# Patient Record
Sex: FEMALE | Race: WHITE | NOT HISPANIC OR LATINO | Employment: FULL TIME | URBAN - METROPOLITAN AREA
[De-identification: names, ages, dates, MRNs, and addresses within clinical notes are randomized per-mention and may not be internally consistent; named-entity substitution may affect disease eponyms.]

---

## 2019-02-27 ENCOUNTER — APPOINTMENT (OUTPATIENT)
Dept: WOMENS IMAGING | Facility: HOSPITAL | Age: 39
End: 2019-02-27

## 2019-02-27 PROCEDURE — 77063 BREAST TOMOSYNTHESIS BI: CPT | Performed by: RADIOLOGY

## 2019-02-27 PROCEDURE — 77067 SCR MAMMO BI INCL CAD: CPT | Performed by: RADIOLOGY

## 2020-12-03 ENCOUNTER — OFFICE VISIT (OUTPATIENT)
Dept: SURGERY | Facility: CLINIC | Age: 40
End: 2020-12-03

## 2020-12-03 VITALS — BODY MASS INDEX: 26.2 KG/M2 | WEIGHT: 183 LBS | HEIGHT: 70 IN

## 2020-12-03 DIAGNOSIS — L02.211 CUTANEOUS ABSCESS OF ABDOMINAL WALL: ICD-10-CM

## 2020-12-03 DIAGNOSIS — L72.3 SEBACEOUS CYST: Primary | ICD-10-CM

## 2020-12-03 PROCEDURE — 87205 SMEAR GRAM STAIN: CPT | Performed by: SURGERY

## 2020-12-03 PROCEDURE — 87070 CULTURE OTHR SPECIMN AEROBIC: CPT | Performed by: SURGERY

## 2020-12-03 PROCEDURE — 10060 I&D ABSCESS SIMPLE/SINGLE: CPT | Performed by: SURGERY

## 2020-12-03 PROCEDURE — 87076 CULTURE ANAEROBE IDENT EACH: CPT | Performed by: SURGERY

## 2020-12-03 RX ORDER — VALACYCLOVIR HYDROCHLORIDE 1 G/1
1000 TABLET, FILM COATED ORAL DAILY
COMMUNITY
Start: 2020-12-02 | End: 2020-12-03

## 2020-12-03 RX ORDER — SULFAMETHOXAZOLE AND TRIMETHOPRIM 800; 160 MG/1; MG/1
1 TABLET ORAL 2 TIMES DAILY
Qty: 6 TABLET | Refills: 0 | Status: SHIPPED | OUTPATIENT
Start: 2020-12-03 | End: 2020-12-08

## 2020-12-03 NOTE — PROGRESS NOTES
PREOPERATIVE DIAGNOSIS:  Infected sebaceous cyst left upper quadrant abdominal wall    POSTOPERATIVE DIAGNOSIS (FINDINGS):  Same    PROCEDURE:  Incision and drainage of infected sebaceous cyst left upper quadrant abdominal wall    SURGEON:  Jj Boucher MD    ANESTHESIA:  1% lidocaine with epinephrine local    EBL:  Minimal    SPECIMEN(S):  Culture    DESCRIPTION:  In supine position prepped and draped usual sterile manner.  1% lidocaine with epinephrine infiltrated locally.  Small transverse incision made directly over the cyst including the punctum of the cyst and several milliliters of sebum and purulent fluid were encountered and drained and cultured.  Once the wound was adequately drained it was packed with dry gauze.  Dressing applied.  Wound care instructions given.  Prescription for Bactrim sent into pharmacy.  Follow-up in 3 weeks.    Jj Boucher M.D.

## 2020-12-05 LAB
BACTERIA SPEC AEROBE CULT: ABNORMAL
GRAM STN SPEC: ABNORMAL
GRAM STN SPEC: ABNORMAL

## 2020-12-14 ENCOUNTER — TELEPHONE (OUTPATIENT)
Dept: SURGERY | Facility: CLINIC | Age: 40
End: 2020-12-14

## 2020-12-14 NOTE — TELEPHONE ENCOUNTER
Patient called today requesting a sedative for her upcoming in office cyst exc on 12/21/20 with Dr. Boucher. Reports significant nervousness about the procedure. Informed patient that it is not typical that the MDs prescribe anything for anxiety prior to a minor procedure, but I will ask regardless.

## 2020-12-15 NOTE — TELEPHONE ENCOUNTER
Can call in valium 5 mg po to take, #2. Problem is someone MUST bring her to office (she cannot drive herself if she takes valium).

## 2020-12-15 NOTE — TELEPHONE ENCOUNTER
Called in Valium 5 mg tab, take 1 tab po 1 hr prior to procedure, #2, NR, to [facility] on [address]   LVM for patient informing her of this and to be sure she has someone with her at the time of her procedure for transport.

## 2020-12-21 ENCOUNTER — OFFICE VISIT (OUTPATIENT)
Dept: SURGERY | Facility: CLINIC | Age: 40
End: 2020-12-21

## 2020-12-21 DIAGNOSIS — L72.3 SEBACEOUS CYST: Primary | ICD-10-CM

## 2020-12-21 PROCEDURE — 99212 OFFICE O/P EST SF 10 MIN: CPT | Performed by: SURGERY

## 2020-12-21 RX ORDER — DIAZEPAM 5 MG/1
5 TABLET ORAL
COMMUNITY
End: 2020-12-21

## 2020-12-22 NOTE — PROGRESS NOTES
IMPRESSION & PLAN:  40-year-old lady underwent incision and drainage of infected sebaceous cyst left upper quadrant abdominal wall in office on 12/3/2020.  She has completed her course of antibiotics.  There is no visible or palpable evidence of recurrent cyst at this time, just typical induration from healing.  I have advised her to keep an eye on the area and return if cyst recurs.    CC: Follow-up from office procedure    HPI: 40-year-old lady who underwent incision and drainage of infected sebaceous cyst of the left upper quadrant abdominal wall on 12/3/2020.  Reports no pain, drainage or other symptoms at this time.    PE:    Awake, alert  Abdomen: In the left upper quadrant is a well-healed small incision from recent drainage with no erythema, no fluctuance, no drainage, and no tenderness.  Mild induration as would be expected.

## 2021-04-16 ENCOUNTER — BULK ORDERING (OUTPATIENT)
Dept: CASE MANAGEMENT | Facility: OTHER | Age: 41
End: 2021-04-16

## 2021-04-16 DIAGNOSIS — Z23 IMMUNIZATION DUE: ICD-10-CM

## 2021-06-11 ENCOUNTER — APPOINTMENT (OUTPATIENT)
Dept: WOMENS IMAGING | Facility: HOSPITAL | Age: 41
End: 2021-06-11

## 2021-06-11 PROCEDURE — 77067 SCR MAMMO BI INCL CAD: CPT | Performed by: RADIOLOGY

## 2021-06-28 ENCOUNTER — TELEPHONE (OUTPATIENT)
Dept: SURGERY | Facility: CLINIC | Age: 41
End: 2021-06-28

## 2021-06-28 NOTE — TELEPHONE ENCOUNTER
Patient said she can any day except 7/1, & 7/2 and would like to know is there any other day this or next week?

## 2021-06-28 NOTE — TELEPHONE ENCOUNTER
Patient called and said that she had been to the ED this weekend related to severe abdominal pain. Looks like she went to [facility] her CT results are Negative and located in Care Everywhere. She said that the ED told her she may want to see a Internist, however they will not be able to see her for at least a month out. She has an appt. With you to discuss abdominal cyst and C-scope  On 7/22/21. She is wanting to see if there were anyway that you could see her this week.

## 2021-08-02 ENCOUNTER — OFFICE VISIT (OUTPATIENT)
Dept: SURGERY | Facility: CLINIC | Age: 41
End: 2021-08-02

## 2021-08-02 VITALS — WEIGHT: 173.4 LBS | BODY MASS INDEX: 24.82 KG/M2 | HEIGHT: 70 IN

## 2021-08-02 DIAGNOSIS — R59.9 LYMPH NODES ENLARGED: ICD-10-CM

## 2021-08-02 DIAGNOSIS — L90.5 SCAR OF ABDOMEN: Primary | ICD-10-CM

## 2021-08-02 DIAGNOSIS — Z80.0 FAMILY HX OF COLON CANCER: ICD-10-CM

## 2021-08-02 PROCEDURE — 99213 OFFICE O/P EST LOW 20 MIN: CPT | Performed by: SURGERY

## 2021-08-03 NOTE — PROGRESS NOTES
IMPRESSION & PLAN:  1.  Small minimally hypertrophied scar at site of previous sebaceous cyst left upper abdominal wall.  No palpable or visible evidence of recurrent cyst.  Recommended using Mederma or silicone patch if the hypertrophic scar is bothersome at all.  2.  Recent development of left cervical adenopathy.  This is adjacent to an insect bite on her left upper back/shoulder and almost certainly represents reactive adenopathy.  Instructed to keep an eye on this area and contact me if the nodes have not resolved within 4 weeks.  3.  Emergency room visit in late June secondary to roughly 2-week episode of epigastric abdominal pain.  The pain was not related to meals.  There is no associated nausea or vomiting.  There is no family history of gallbladder disease.  The symptoms have completely resolved.  CT scan at [facility] emergency room was negative.  No further work-up or treatment is warranted unless symptoms recur.  4.  Family history of colon cancer in father.  She has scheduled for colonoscopy on 9/1/2021.    CC: Principal complaint is discomfort in left upper quadrant abdominal wall    HPI: 40-year-old lady who underwent incision and drainage of infected sebaceous cyst left upper quadrant abdominal wall on 12/3/2020 and has some discomfort at that scar and is concerned about recurrence.  She also had an episode of epigastric pain that was intermittent and lasted 8 to 9 days towards the end of June and ultimately led to an emergency room visit at [facility] where CT scan of the abdomen was unremarkable.  The symptoms have since completely resolved.  Lastly, in the last 5 days she has noticed palpable nodules in the left cervical region adjacent to an insect bite along her left upper back/shoulder.    PE:    Awake, alert  Abdomen: Soft and nontender.  There is a 1 cm very minimally hypertrophied scar from her former incision and drainage site.  There is no visible or palpable evidence of recurrent  cyst.  Lymph nodes: 2 palpable posterior left cervical lymph nodes that are both under 1 cm and soft and freely movable.  These are adjacent to an insect bite on the upper back/left shoulder.  There is no erythema, crepitance or fluctuance over the insect bite.    LABS:      RADIOLOGY:

## 2021-09-01 ENCOUNTER — ANESTHESIA EVENT (OUTPATIENT)
Dept: GASTROENTEROLOGY | Facility: HOSPITAL | Age: 41
End: 2021-09-01

## 2021-09-01 ENCOUNTER — ANESTHESIA (OUTPATIENT)
Dept: GASTROENTEROLOGY | Facility: HOSPITAL | Age: 41
End: 2021-09-01

## 2021-09-01 ENCOUNTER — HOSPITAL ENCOUNTER (OUTPATIENT)
Facility: HOSPITAL | Age: 41
Setting detail: HOSPITAL OUTPATIENT SURGERY
Discharge: HOME | End: 2021-09-01
Attending: SURGERY | Admitting: SURGERY

## 2021-09-01 VITALS
WEIGHT: 174.4 LBS | SYSTOLIC BLOOD PRESSURE: 111 MMHG | BODY MASS INDEX: 25.83 KG/M2 | HEART RATE: 64 BPM | DIASTOLIC BLOOD PRESSURE: 75 MMHG | TEMPERATURE: 98.2 F | RESPIRATION RATE: 16 BRPM | HEIGHT: 69 IN | OXYGEN SATURATION: 97 %

## 2021-09-01 DIAGNOSIS — Z80.0 FAMILY HX OF COLON CANCER: ICD-10-CM

## 2021-09-01 LAB
B-HCG UR QL: NEGATIVE
INTERNAL NEGATIVE CONTROL: NEGATIVE
INTERNAL POSITIVE CONTROL: POSITIVE
Lab: NORMAL

## 2021-09-01 PROCEDURE — 88305 TISSUE EXAM BY PATHOLOGIST: CPT | Performed by: SURGERY

## 2021-09-01 PROCEDURE — 25010000002 PROPOFOL 10 MG/ML EMULSION: Performed by: NURSE ANESTHETIST, CERTIFIED REGISTERED

## 2021-09-01 PROCEDURE — 45385 COLONOSCOPY W/LESION REMOVAL: CPT | Performed by: SURGERY

## 2021-09-01 PROCEDURE — S0260 H&P FOR SURGERY: HCPCS | Performed by: SURGERY

## 2021-09-01 PROCEDURE — 81025 URINE PREGNANCY TEST: CPT | Performed by: SURGERY

## 2021-09-01 RX ORDER — SODIUM CHLORIDE, SODIUM LACTATE, POTASSIUM CHLORIDE, CALCIUM CHLORIDE 600; 310; 30; 20 MG/100ML; MG/100ML; MG/100ML; MG/100ML
30 INJECTION, SOLUTION INTRAVENOUS CONTINUOUS PRN
Status: DISCONTINUED | OUTPATIENT
Start: 2021-09-01 | End: 2021-09-01 | Stop reason: HOSPADM

## 2021-09-01 RX ORDER — PROPOFOL 10 MG/ML
VIAL (ML) INTRAVENOUS CONTINUOUS PRN
Status: DISCONTINUED | OUTPATIENT
Start: 2021-09-01 | End: 2021-09-01 | Stop reason: SURG

## 2021-09-01 RX ORDER — PROPOFOL 10 MG/ML
VIAL (ML) INTRAVENOUS AS NEEDED
Status: DISCONTINUED | OUTPATIENT
Start: 2021-09-01 | End: 2021-09-01 | Stop reason: SURG

## 2021-09-01 RX ADMIN — Medication 200 MCG/KG/MIN: at 13:09

## 2021-09-01 RX ADMIN — PROPOFOL 40 MG: 10 INJECTION, EMULSION INTRAVENOUS at 13:10

## 2021-09-01 RX ADMIN — SODIUM CHLORIDE, POTASSIUM CHLORIDE, SODIUM LACTATE AND CALCIUM CHLORIDE 30 ML/HR: 600; 310; 30; 20 INJECTION, SOLUTION INTRAVENOUS at 12:42

## 2021-09-01 RX ADMIN — PROPOFOL 80 MG: 10 INJECTION, EMULSION INTRAVENOUS at 13:09

## 2021-09-01 NOTE — ANESTHESIA PREPROCEDURE EVALUATION
Anesthesia Evaluation     Patient summary reviewed and Nursing notes reviewed                Airway   Mallampati: II  TM distance: >3 FB  Neck ROM: full  No difficulty expected  Dental - normal exam     Pulmonary - normal exam   Cardiovascular - normal exam        Neuro/Psych  (+) psychiatric history Anxiety,     GI/Hepatic/Renal/Endo      Musculoskeletal     Abdominal  - normal exam   Substance History      OB/GYN          Other                        Anesthesia Plan    ASA 2     MAC     intravenous induction     Anesthetic plan, all risks, benefits, and alternatives have been provided, discussed and informed consent has been obtained with: patient.

## 2021-09-01 NOTE — ANESTHESIA POSTPROCEDURE EVALUATION
"Patient: Bridget Oliva    Procedure Summary     Date: 09/01/21 Room / Location: [facility] ENDOSCOPY 1 /  [facility]    Anesthesia Start: 1305 Anesthesia Stop: 1337    Procedure: COLONOSCOPY TO CECUM WITH HOT POLYPECTOMIES (N/A ) Diagnosis:       Family hx of colon cancer      (Family hx of colon cancer [Z80.0])    Surgeons: Jj Boucher MD Provider: Louie Ignacio MD    Anesthesia Type: MAC ASA Status: 2          Anesthesia Type: MAC    Vitals  Vitals Value Taken Time   /75 09/01/21 1355   Temp     Pulse 64 09/01/21 1355   Resp 16 09/01/21 1355   SpO2 97 % 09/01/21 1355           Post Anesthesia Care and Evaluation    Patient location during evaluation: bedside  Patient participation: complete - patient participated  Level of consciousness: awake and alert  Pain management: adequate  Airway patency: patent  Anesthetic complications: No anesthetic complications    Cardiovascular status: acceptable  Respiratory status: acceptable  Hydration status: acceptable    Comments: /75 (BP Location: Left arm, Patient Position: Lying)   Pulse 64   Temp 36.8 °C (98.2 °F) (Oral)   Resp 16   Ht 175.3 cm (69\")   Wt 79.1 kg (174 lb 6.4 oz)   LMP 08/15/2021   SpO2 97%   BMI 25.75 kg/m²       "

## 2021-09-01 NOTE — OP NOTE
PREOPERATIVE DIAGNOSIS:  High risk screening secondary to family history of colon cancer-father    POSTOPERATIVE DIAGNOSIS AND FINDINGS:  · 1 cm pedunculated polyp transverse colon  · Small sigmoid polyp    PROCEDURE:  Colonoscopy to cecum with snare polypectomy x2    SURGEON:  Jj Boucher MD    ANESTHESIA:  MAC    SPECIMEN(S):  Polyps    DESCRIPTION:  In decubitus position digital rectal exam was normal. Colonoscope inserted under direct visualization of lumen to cecum confirmed by visualization of ileocecal valve and appendiceal orifice.    Scope slowly withdrawn circumferentially examining all mucosal surfaces.    Quality of bowel preparation good.    1 cm pedunculated polyp noted in transverse colon, completely removed with hot snare and retrieved, good hemostasis at the site.  Small polyp in the sigmoid colon removed with hot snare and retrieved, good hemostasis at the site.  No other mucosal abnormalities noted.    Tolerated well.    RECOMMENDATION FOR FUTURE SURVEILLANCE:  To be determined based on polyp pathology and issued as separate report    Jj Boucher M.D.

## 2021-09-02 LAB
LAB AP CASE REPORT: NORMAL
PATH REPORT.FINAL DX SPEC: NORMAL
PATH REPORT.GROSS SPEC: NORMAL

## 2021-09-03 ENCOUNTER — DOCUMENTATION (OUTPATIENT)
Dept: SURGERY | Facility: CLINIC | Age: 41
End: 2021-09-03

## 2021-09-03 NOTE — PROGRESS NOTES
Please let her know that she had 2 benign polyps and 5-year surveillance colonoscopy recommended-put in computer for reminder

## 2021-09-03 NOTE — PROGRESS NOTES
ENDOSCOPY FOLLOW UP NOTE    Colonoscopy 9/1/2021    Indication:  Family history of colon cancer-father    Findings (Pathology):  1 cm pedunculated polyp transverse colon (tubular adenoma)    Recommendations:  5-year surveillance    Jj Boucher M.D.

## 2021-09-07 ENCOUNTER — TELEPHONE (OUTPATIENT)
Dept: SURGERY | Facility: CLINIC | Age: 41
End: 2021-09-07

## 2021-09-07 NOTE — TELEPHONE ENCOUNTER
----- Message from Jj Boucher MD sent at 9/3/2021 11:51 AM EDT -----  Please let her know that she had 2 benign polyps and 5-year surveillance colonoscopy recommended-put in computer for reminder

## 2022-02-01 ENCOUNTER — TELEPHONE (OUTPATIENT)
Dept: SURGERY | Facility: CLINIC | Age: 42
End: 2022-02-01

## 2022-02-03 ENCOUNTER — OFFICE VISIT (OUTPATIENT)
Dept: SURGERY | Facility: CLINIC | Age: 42
End: 2022-02-03

## 2022-02-03 VITALS — BODY MASS INDEX: 25.83 KG/M2 | HEIGHT: 70 IN | WEIGHT: 180.4 LBS

## 2022-02-03 DIAGNOSIS — L72.3 SEBACEOUS CYST: Primary | ICD-10-CM

## 2022-02-03 PROCEDURE — 10060 I&D ABSCESS SIMPLE/SINGLE: CPT | Performed by: SURGERY

## 2022-02-03 RX ORDER — CEPHALEXIN 500 MG/1
1 CAPSULE ORAL 3 TIMES DAILY
COMMUNITY
Start: 2022-02-01 | End: 2022-02-15

## 2022-02-03 NOTE — PROGRESS NOTES
Office procedure    Preoperative diagnosis: Left upper abdominal wall sebaceous cyst abscess  Postoperative diagnosis: Same  Procedure: Incision and drainage of left upper abdominal wall sebaceous cyst abscess  Surgeon: Sander  Anesthesia: 1% lidocaine with epinephrine  Specimen: None  Description: In supine position, prepped and draped usual sterile manner.  1% lidocaine with epinephrine infiltrated locally.  Small transverse incision made in the left upper quadrant and small amount of sebum/purulent fluid drained from the cyst.  Good hemostasis noted.  Wound packed with iodoform gauze and wound care instructions given.

## 2022-02-14 ENCOUNTER — PREP FOR SURGERY (OUTPATIENT)
Dept: OTHER | Facility: HOSPITAL | Age: 42
End: 2022-02-14

## 2022-02-14 ENCOUNTER — TELEPHONE (OUTPATIENT)
Dept: SURGERY | Facility: CLINIC | Age: 42
End: 2022-02-14

## 2022-02-14 DIAGNOSIS — L72.3 SEBACEOUS CYST: Primary | ICD-10-CM

## 2022-02-14 RX ORDER — CEFAZOLIN SODIUM 2 G/100ML
2 INJECTION, SOLUTION INTRAVENOUS ONCE
Status: CANCELLED | OUTPATIENT
Start: 2022-02-17 | End: 2022-02-14

## 2022-02-15 ENCOUNTER — PRE-ADMISSION TESTING (OUTPATIENT)
Dept: PREADMISSION TESTING | Facility: HOSPITAL | Age: 42
End: 2022-02-15

## 2022-02-15 VITALS
HEART RATE: 71 BPM | HEIGHT: 70 IN | SYSTOLIC BLOOD PRESSURE: 134 MMHG | OXYGEN SATURATION: 99 % | TEMPERATURE: 97.5 F | RESPIRATION RATE: 18 BRPM | WEIGHT: 180 LBS | BODY MASS INDEX: 25.77 KG/M2 | DIASTOLIC BLOOD PRESSURE: 88 MMHG

## 2022-02-15 DIAGNOSIS — L72.3 SEBACEOUS CYST: ICD-10-CM

## 2022-02-15 LAB
DEPRECATED RDW RBC AUTO: 41.1 FL (ref 37–54)
ERYTHROCYTE [DISTWIDTH] IN BLOOD BY AUTOMATED COUNT: 12.6 % (ref 12.3–15.4)
HCG SERPL QL: NEGATIVE
HCT VFR BLD AUTO: 40.1 % (ref 34–46.6)
HGB BLD-MCNC: 13.5 G/DL (ref 12–15.9)
MCH RBC QN AUTO: 30.3 PG (ref 26.6–33)
MCHC RBC AUTO-ENTMCNC: 33.7 G/DL (ref 31.5–35.7)
MCV RBC AUTO: 89.9 FL (ref 79–97)
PLATELET # BLD AUTO: 232 10*3/MM3 (ref 140–450)
PMV BLD AUTO: 9.8 FL (ref 6–12)
RBC # BLD AUTO: 4.46 10*6/MM3 (ref 3.77–5.28)
SARS-COV-2 ORF1AB RESP QL NAA+PROBE: NOT DETECTED
WBC NRBC COR # BLD: 6.83 10*3/MM3 (ref 3.4–10.8)

## 2022-02-15 PROCEDURE — 84703 CHORIONIC GONADOTROPIN ASSAY: CPT

## 2022-02-15 PROCEDURE — C9803 HOPD COVID-19 SPEC COLLECT: HCPCS

## 2022-02-15 PROCEDURE — 85027 COMPLETE CBC AUTOMATED: CPT

## 2022-02-15 PROCEDURE — U0004 COV-19 TEST NON-CDC HGH THRU: HCPCS

## 2022-02-15 PROCEDURE — 36415 COLL VENOUS BLD VENIPUNCTURE: CPT

## 2022-02-15 NOTE — DISCHARGE INSTRUCTIONS
Take the following medications the morning of surgery: NONE    ARRIVE  AM ON 2/17/22    If you are on prescription narcotic pain medication to control your pain you may also take that medication the morning of surgery.    General Instructions:  • Do not eat solid food after midnight the night before surgery.  • You may drink clear liquids day of surgery but must stop at least one hour before your hospital arrival time.  • It is beneficial for you to have a clear drink that contains carbohydrates the day of surgery.  We suggest a 12 to 20 ounce bottle of Gatorade or Powerade for non-diabetic patients or a 12 to 20 ounce bottle of G2 or Powerade Zero for diabetic patients. (Pediatric patients, are not advised to drink a 12 to 20 ounce carbohydrate drink)    Clear liquids are liquids you can see through.  Nothing red in color.     Plain water                               Sports drinks  Sodas                                   Gelatin (Jell-O)  Fruit juices without pulp such as white grape juice and apple juice  Popsicles that contain no fruit or yogurt  Tea or coffee (no cream or milk added)  Gatorade / Powerade  G2 / Powerade Zero    • Infants may have breast milk up to four hours before surgery.  • Infants drinking formula may drink formula up to six hours before surgery.   • Patients who avoid smoking, chewing tobacco and alcohol for 4 weeks prior to surgery have a reduced risk of post-operative complications.  Quit smoking as many days before surgery as you can.  • Do not smoke, use chewing tobacco or drink alcohol the day of surgery.   • If applicable bring your C-PAP/ BI-PAP machine.  • Bring any papers given to you in the doctor’s office.  • Wear clean comfortable clothes.  • Do not wear contact lenses, false eyelashes or make-up.  Bring a case for your glasses.   • Bring crutches or walker if applicable.  • Remove all piercings.  Leave jewelry and any other valuables at home.  • Hair extensions with metal  clips must be removed prior to surgery.  • The Pre-Admission Testing nurse will instruct you to bring medications if unable to obtain an accurate list in Pre-Admission Testing.            Preventing a Surgical Site Infection:  • For 2 to 3 days before surgery, avoid shaving with a razor because the razor can irritate skin and make it easier to develop an infection.    • Any areas of open skin can increase the risk of a post-operative wound infection by allowing bacteria to enter and travel throughout the body.  Notify your surgeon if you have any skin wounds / rashes even if it is not near the expected surgical site.  The area will need assessed to determine if surgery should be delayed until it is healed.  • The night prior to surgery shower using a fresh bar of anti-bacterial soap (such as Dial) and clean washcloth.  Sleep in a clean bed with clean clothing.  Do not allow pets to sleep with you.  • Shower on the morning of surgery using a fresh bar of anti-bacterial soap (such as Dial) and clean washcloth.  Dry with a clean towel and dress in clean clothing.  • Ask your surgeon if you will be receiving antibiotics prior to surgery.  • Make sure you, your family, and all healthcare providers clean their hands with soap and water or an alcohol based hand  before caring for you or your wound.    Day of surgery:  Your arrival time is approximately two hours before your scheduled surgery time.  Upon arrival, a Pre-op nurse and Anesthesiologist will review your health history, obtain vital signs, and answer questions you may have.  The only belongings needed at this time will be a list of your home medications and if applicable your C-PAP/BI-PAP machine.  A Pre-op nurse will start an IV and you may receive medication in preparation for surgery, including something to help you relax.     Please be aware that surgery does come with discomfort.  We want to make every effort to control your discomfort so please  discuss any uncontrolled symptoms with your nurse.   Your doctor will most likely have prescribed pain medications.      If you are going home after surgery you will receive individualized written care instructions before being discharged.  A responsible adult must drive you to and from the hospital on the day of your surgery and stay with you for 24 hours.  Discharge prescriptions can be filled by the hospital pharmacy during regular pharmacy hours.  If you are having surgery late in the day/evening your prescription may be e-prescribed to your pharmacy.  Please verify your pharmacy hours or chose a 24 hour pharmacy to avoid not having access to your prescription because your pharmacy has closed for the day.    If you are staying overnight following surgery, you will be transported to your hospital room following the recovery period.  [facility] has all private rooms.    If you have any questions please call Pre-Admission Testing at [phone].  Deductibles and co-payments are collected on the day of service. Please be prepared to pay the required co-pay, deductible or deposit on the day of service as defined by your plan.    Patient Education for Self-Quarantine Process    • Following your COVID testing, we strongly recommend that you wear a mask when you are with other people and practice social distancing.   • Limit your activities to only required outings.  • Wash your hands with soap and water frequently for at least 20 seconds.   • Avoid touching your eyes, nose and mouth with unwashed hands.  • Do not share anything - utensils, drinking glasses, food from the same bowl.   • Sanitize household surfaces daily. Include all high touch areas (door handles, light switches, phones, countertops, etc.)    Call your surgeon immediately if you experience any of the following symptoms:  • Sore Throat  • Shortness of Breath or difficulty breathing  • Cough  • Chills  • Body soreness or muscle  pain  • Headache  • Fever  • New loss of taste or smell  • Do not arrive for your surgery ill.  Your procedure will need to be rescheduled to another time.  You will need to call your physician before the day of surgery to avoid any unnecessary exposure to hospital staff as well as other patients.    CHLORHEXIDINE CLOTH INSTRUCTIONS  The morning of surgery follow these instructions using the Chlorhexidine cloths you've been given.  These steps reduce bacteria on the body.  Do not use the cloths near your eyes, ears mouth, genitalia or on open wounds.  Throw the cloths away after use but do not try to flush them down a toilet.      • Open and remove one cloth at a time from the package.    • Leave the cloth unfolded and begin the bathing.  • Massage the skin with the cloths using gentle pressure to remove bacteria.  Do not scrub harshly.   • Follow the steps below with one 2% CHG cloth per area (6 total cloths).  • One cloth for neck, shoulders and chest.  • One cloth for both arms, hands, fingers and underarms (do underarms last).  • One cloth for the abdomen followed by groin.  • One cloth for right leg and foot including between the toes.  • One cloth for left leg and foot including between the toes.  • The last cloth is to be used for the back of the neck, back and buttocks.    Allow the CHG to air dry 3 minutes on the skin which will give it time to work and decrease the chance of irritation.  The skin may feel sticky until it is dry.  Do not rinse with water or any other liquid or you will lose the beneficial effects of the CHG.  If mild skin irritation occurs, do rinse the skin to remove the CHG.  Report this to the nurse at time of admission.  Do not apply lotions, creams, ointments, deodorants or perfumes after using the clothes. Dress in clean clothes before coming to the hospital.

## 2022-02-17 ENCOUNTER — HOSPITAL ENCOUNTER (OUTPATIENT)
Facility: HOSPITAL | Age: 42
Setting detail: HOSPITAL OUTPATIENT SURGERY
Discharge: HOME | End: 2022-02-17
Attending: SURGERY | Admitting: SURGERY

## 2022-02-17 ENCOUNTER — ANESTHESIA EVENT (OUTPATIENT)
Dept: PERIOP | Facility: HOSPITAL | Age: 42
End: 2022-02-17

## 2022-02-17 ENCOUNTER — ANESTHESIA (OUTPATIENT)
Dept: PERIOP | Facility: HOSPITAL | Age: 42
End: 2022-02-17

## 2022-02-17 VITALS
TEMPERATURE: 97.9 F | RESPIRATION RATE: 18 BRPM | DIASTOLIC BLOOD PRESSURE: 75 MMHG | HEART RATE: 60 BPM | OXYGEN SATURATION: 99 % | SYSTOLIC BLOOD PRESSURE: 122 MMHG

## 2022-02-17 DIAGNOSIS — L72.3 SEBACEOUS CYST: ICD-10-CM

## 2022-02-17 PROCEDURE — 0 CEFAZOLIN IN DEXTROSE 2-4 GM/100ML-% SOLUTION: Performed by: SURGERY

## 2022-02-17 PROCEDURE — 11402 EXC TR-EXT B9+MARG 1.1-2 CM: CPT | Performed by: SURGERY

## 2022-02-17 PROCEDURE — S0260 H&P FOR SURGERY: HCPCS | Performed by: SURGERY

## 2022-02-17 PROCEDURE — 88304 TISSUE EXAM BY PATHOLOGIST: CPT | Performed by: SURGERY

## 2022-02-17 PROCEDURE — 25010000002 FENTANYL CITRATE (PF) 50 MCG/ML SOLUTION: Performed by: NURSE ANESTHETIST, CERTIFIED REGISTERED

## 2022-02-17 PROCEDURE — 25010000002 PROPOFOL 10 MG/ML EMULSION: Performed by: NURSE ANESTHETIST, CERTIFIED REGISTERED

## 2022-02-17 PROCEDURE — 12031 INTMD RPR S/A/T/EXT 2.5 CM/<: CPT | Performed by: SURGERY

## 2022-02-17 RX ORDER — IPRATROPIUM BROMIDE AND ALBUTEROL SULFATE 2.5; .5 MG/3ML; MG/3ML
3 SOLUTION RESPIRATORY (INHALATION) ONCE AS NEEDED
Status: DISCONTINUED | OUTPATIENT
Start: 2022-02-17 | End: 2022-02-17 | Stop reason: HOSPADM

## 2022-02-17 RX ORDER — SODIUM CHLORIDE, SODIUM LACTATE, POTASSIUM CHLORIDE, CALCIUM CHLORIDE 600; 310; 30; 20 MG/100ML; MG/100ML; MG/100ML; MG/100ML
9 INJECTION, SOLUTION INTRAVENOUS CONTINUOUS
Status: DISCONTINUED | OUTPATIENT
Start: 2022-02-17 | End: 2022-02-17 | Stop reason: HOSPADM

## 2022-02-17 RX ORDER — ACETAMINOPHEN 500 MG
1000 TABLET ORAL ONCE
Status: COMPLETED | OUTPATIENT
Start: 2022-02-17 | End: 2022-02-17

## 2022-02-17 RX ORDER — DIPHENHYDRAMINE HYDROCHLORIDE 50 MG/ML
12.5 INJECTION INTRAMUSCULAR; INTRAVENOUS
Status: DISCONTINUED | OUTPATIENT
Start: 2022-02-17 | End: 2022-02-17 | Stop reason: HOSPADM

## 2022-02-17 RX ORDER — HYDROMORPHONE HYDROCHLORIDE 1 MG/ML
0.5 INJECTION, SOLUTION INTRAMUSCULAR; INTRAVENOUS; SUBCUTANEOUS
Status: DISCONTINUED | OUTPATIENT
Start: 2022-02-17 | End: 2022-02-17 | Stop reason: HOSPADM

## 2022-02-17 RX ORDER — SODIUM CHLORIDE 0.9 % (FLUSH) 0.9 %
3-10 SYRINGE (ML) INJECTION AS NEEDED
Status: DISCONTINUED | OUTPATIENT
Start: 2022-02-17 | End: 2022-02-17 | Stop reason: HOSPADM

## 2022-02-17 RX ORDER — SODIUM CHLORIDE 0.9 % (FLUSH) 0.9 %
3 SYRINGE (ML) INJECTION EVERY 12 HOURS SCHEDULED
Status: DISCONTINUED | OUTPATIENT
Start: 2022-02-17 | End: 2022-02-17 | Stop reason: HOSPADM

## 2022-02-17 RX ORDER — OXYCODONE AND ACETAMINOPHEN 7.5; 325 MG/1; MG/1
1 TABLET ORAL EVERY 4 HOURS PRN
Status: DISCONTINUED | OUTPATIENT
Start: 2022-02-17 | End: 2022-02-17 | Stop reason: HOSPADM

## 2022-02-17 RX ORDER — ONDANSETRON 2 MG/ML
4 INJECTION INTRAMUSCULAR; INTRAVENOUS ONCE AS NEEDED
Status: DISCONTINUED | OUTPATIENT
Start: 2022-02-17 | End: 2022-02-17 | Stop reason: HOSPADM

## 2022-02-17 RX ORDER — LIDOCAINE HYDROCHLORIDE 10 MG/ML
0.5 INJECTION, SOLUTION EPIDURAL; INFILTRATION; INTRACAUDAL; PERINEURAL ONCE AS NEEDED
Status: DISCONTINUED | OUTPATIENT
Start: 2022-02-17 | End: 2022-02-17 | Stop reason: HOSPADM

## 2022-02-17 RX ORDER — PROPOFOL 10 MG/ML
VIAL (ML) INTRAVENOUS CONTINUOUS PRN
Status: DISCONTINUED | OUTPATIENT
Start: 2022-02-17 | End: 2022-02-17 | Stop reason: SURG

## 2022-02-17 RX ORDER — SODIUM CHLORIDE, SODIUM LACTATE, POTASSIUM CHLORIDE, CALCIUM CHLORIDE 600; 310; 30; 20 MG/100ML; MG/100ML; MG/100ML; MG/100ML
100 INJECTION, SOLUTION INTRAVENOUS CONTINUOUS
Status: DISCONTINUED | OUTPATIENT
Start: 2022-02-17 | End: 2022-02-17 | Stop reason: HOSPADM

## 2022-02-17 RX ORDER — HYDROCODONE BITARTRATE AND ACETAMINOPHEN 7.5; 325 MG/1; MG/1
1 TABLET ORAL ONCE AS NEEDED
Status: DISCONTINUED | OUTPATIENT
Start: 2022-02-17 | End: 2022-02-17 | Stop reason: HOSPADM

## 2022-02-17 RX ORDER — BUPIVACAINE HYDROCHLORIDE AND EPINEPHRINE 5; 5 MG/ML; UG/ML
INJECTION, SOLUTION EPIDURAL; INTRACAUDAL; PERINEURAL AS NEEDED
Status: DISCONTINUED | OUTPATIENT
Start: 2022-02-17 | End: 2022-02-17 | Stop reason: HOSPADM

## 2022-02-17 RX ORDER — PROMETHAZINE HYDROCHLORIDE 25 MG/1
25 SUPPOSITORY RECTAL ONCE AS NEEDED
Status: DISCONTINUED | OUTPATIENT
Start: 2022-02-17 | End: 2022-02-17 | Stop reason: HOSPADM

## 2022-02-17 RX ORDER — NALOXONE HCL 0.4 MG/ML
0.2 VIAL (ML) INJECTION AS NEEDED
Status: DISCONTINUED | OUTPATIENT
Start: 2022-02-17 | End: 2022-02-17 | Stop reason: HOSPADM

## 2022-02-17 RX ORDER — EPHEDRINE SULFATE 50 MG/ML
5 INJECTION, SOLUTION INTRAVENOUS ONCE AS NEEDED
Status: DISCONTINUED | OUTPATIENT
Start: 2022-02-17 | End: 2022-02-17 | Stop reason: HOSPADM

## 2022-02-17 RX ORDER — FENTANYL CITRATE 50 UG/ML
50 INJECTION, SOLUTION INTRAMUSCULAR; INTRAVENOUS
Status: DISCONTINUED | OUTPATIENT
Start: 2022-02-17 | End: 2022-02-17 | Stop reason: HOSPADM

## 2022-02-17 RX ORDER — CEFAZOLIN SODIUM 2 G/100ML
2 INJECTION, SOLUTION INTRAVENOUS ONCE
Status: COMPLETED | OUTPATIENT
Start: 2022-02-17 | End: 2022-02-17

## 2022-02-17 RX ORDER — FLUMAZENIL 0.1 MG/ML
0.2 INJECTION INTRAVENOUS AS NEEDED
Status: DISCONTINUED | OUTPATIENT
Start: 2022-02-17 | End: 2022-02-17 | Stop reason: HOSPADM

## 2022-02-17 RX ORDER — PROMETHAZINE HYDROCHLORIDE 25 MG/1
25 TABLET ORAL ONCE AS NEEDED
Status: DISCONTINUED | OUTPATIENT
Start: 2022-02-17 | End: 2022-02-17 | Stop reason: HOSPADM

## 2022-02-17 RX ORDER — LIDOCAINE HYDROCHLORIDE 20 MG/ML
INJECTION, SOLUTION INFILTRATION; PERINEURAL AS NEEDED
Status: DISCONTINUED | OUTPATIENT
Start: 2022-02-17 | End: 2022-02-17 | Stop reason: SURG

## 2022-02-17 RX ORDER — IBUPROFEN 600 MG/1
600 TABLET ORAL ONCE AS NEEDED
Status: DISCONTINUED | OUTPATIENT
Start: 2022-02-17 | End: 2022-02-17 | Stop reason: HOSPADM

## 2022-02-17 RX ORDER — MIDAZOLAM HYDROCHLORIDE 1 MG/ML
1 INJECTION INTRAMUSCULAR; INTRAVENOUS
Status: DISCONTINUED | OUTPATIENT
Start: 2022-02-17 | End: 2022-02-17 | Stop reason: HOSPADM

## 2022-02-17 RX ORDER — HYDRALAZINE HYDROCHLORIDE 20 MG/ML
5 INJECTION INTRAMUSCULAR; INTRAVENOUS
Status: DISCONTINUED | OUTPATIENT
Start: 2022-02-17 | End: 2022-02-17 | Stop reason: HOSPADM

## 2022-02-17 RX ORDER — MAGNESIUM HYDROXIDE 1200 MG/15ML
LIQUID ORAL AS NEEDED
Status: DISCONTINUED | OUTPATIENT
Start: 2022-02-17 | End: 2022-02-17 | Stop reason: HOSPADM

## 2022-02-17 RX ORDER — DIPHENHYDRAMINE HCL 25 MG
25 CAPSULE ORAL
Status: DISCONTINUED | OUTPATIENT
Start: 2022-02-17 | End: 2022-02-17 | Stop reason: HOSPADM

## 2022-02-17 RX ORDER — FENTANYL CITRATE 50 UG/ML
INJECTION, SOLUTION INTRAMUSCULAR; INTRAVENOUS AS NEEDED
Status: DISCONTINUED | OUTPATIENT
Start: 2022-02-17 | End: 2022-02-17 | Stop reason: SURG

## 2022-02-17 RX ORDER — LABETALOL HYDROCHLORIDE 5 MG/ML
5 INJECTION, SOLUTION INTRAVENOUS
Status: DISCONTINUED | OUTPATIENT
Start: 2022-02-17 | End: 2022-02-17 | Stop reason: HOSPADM

## 2022-02-17 RX ADMIN — SODIUM CHLORIDE, POTASSIUM CHLORIDE, SODIUM LACTATE AND CALCIUM CHLORIDE 9 ML/HR: 600; 310; 30; 20 INJECTION, SOLUTION INTRAVENOUS at 06:29

## 2022-02-17 RX ADMIN — ACETAMINOPHEN 1000 MG: 500 TABLET ORAL at 06:29

## 2022-02-17 RX ADMIN — CEFAZOLIN SODIUM 2 G: 2 INJECTION, SOLUTION INTRAVENOUS at 06:51

## 2022-02-17 RX ADMIN — LIDOCAINE HYDROCHLORIDE 60 MG: 20 INJECTION, SOLUTION INFILTRATION; PERINEURAL at 06:56

## 2022-02-17 RX ADMIN — PROPOFOL 120 MCG/KG/MIN: 10 INJECTION, EMULSION INTRAVENOUS at 06:56

## 2022-02-17 RX ADMIN — FENTANYL CITRATE 50 MCG: 50 INJECTION INTRAMUSCULAR; INTRAVENOUS at 06:55

## 2022-02-17 RX ADMIN — FENTANYL CITRATE 50 MCG: 50 INJECTION INTRAMUSCULAR; INTRAVENOUS at 07:10

## 2022-02-17 NOTE — DISCHARGE INSTRUCTIONS
Dr. Jj Boucher  [address]  [place] [address]  [phone]    Discharge Instructions for Minor Surgery    1. Go home, rest and take it easy today; however, you should get up and move about several times today to reduce the risk of developing a clot in your legs.      2. You may experience some dizziness or memory loss from the anesthesia.  This may last for the next 24 hours.  Someone should plan on staying with you for the first 24 hours for your safety.    3. Do not make any important legal decisions or sign any legal papers for the next 24 hours.      4. Eat and drink lightly today.  Start off with liquids, jello, soup, crackers or other bland foods at first. You may advance your diet tomorrow as tolerated as long as you do not experience any nausea or vomiting.     5. If skin glue (Dermabond) was used, your incisions are protected and covered.  The invisible glue will dissolve on its own as your incision heals. If dressings were used, you may remove your outer dressings in 3-4 days.   Please leave the little white tapes known as steri-strips alone.  They usually fall off in 1-2 weeks.  Do not worry if they come off sooner.     6. If dressings were used, you may notice some bleeding/drainage on your outer dressings. A little bloody drainage is normal. If the bleeding/drainage is such that the bandage cannot absorb it, remove the dressing, apply clean gauze and apply firm pressure for a full 15 minutes.  If the bleeding continues, please call me.    7. You may shower tomorrow allowing water to run over your incisions; however, do not scrub your incisions.  No tub baths until your incisions are completely healed.    8. You have received a prescription for a narcotic pain medicine, as you may have some pain/discomfort following surgery.   You will not be totally pain free, but your pain medicine should make the pain tolerable.  Please take your pain medicine as prescribed and always take  your pills with food to prevent nausea. If you are having severe pain that cannot be controlled by the pain medicine, please contact me.  If the pain is such that narcotic pain medicine is not required, you may take Tylenol or Ibuprofen as directed unless indicated otherwise.      9. You have also received a prescription for an anti-nausea medicine.  Please take this as prescribed for any nausea or vomiting.  Nausea could be a result of the anesthesia or a result of the narcotic pain medicine.  If you experience severe nausea and vomiting that cannot be controlled by the nausea medicine, please call me.      10. No driving for 24 hours and for as long as you are taking your prescription pain medicine.      11. Please call the office at [phone] to schedule a follow-up in about 1 week.      12. Remember to contact me for any of the following:    • Fever> 101 degrees  • Severe pain that cannot be controlled by taking your pain pills  • Severe nausea or vomiting that cannot be controlled by taking your nausea medicine  • Significant bleeding from your incision  • Drainage that has a bad smell or is yellow or green in appearance  • Any other questions or concerns

## 2022-02-17 NOTE — OP NOTE
PREOPERATIVE DIAGNOSIS:  Tender enlarging sebaceous cyst abdominal wall    POSTOPERATIVE DIAGNOSIS (FINDINGS):  Same    PROCEDURE:  1.5 x 4.5 cm elliptical excision of sebaceous cyst abdominal wall    SURGEON:  Jj Boucher MD    ASSISTANT:  None    ANESTHESIA:  Monitored sedation    EBL:  Minimal    SPECIMEN(S):  Sebaceous cyst    DESCRIPTION:  In supine position under sedation prepped and draped usual sterile manner.  Half percent Marcaine with epinephrine infiltrated locally.  1.5 x 4.5 cm elliptical incision was made with a knife and the cyst was removed completely and intact with the electrocautery.  Hemostasis achieved with electrocautery.  Skin closed with 3-0 Vicryl deep dermal and 5-0 Vicryl subcuticular followed by Dermabond.  Tolerated well.    Jj Boucher M.D.

## 2022-02-17 NOTE — H&P
HPI: 41-year-old lady with recurrent tender enlarging sebaceous cyst abdominal wall    PMH, PSH, MEDS AND ALLERGIES reviewed and reconciled with EPIC    PHYSICAL EXAM:  -  Constitutional:  no acute distress  -  Respiratory:  normal inspiratory effort  -  Cardiovascular: regular rate  -  Gastrointestinal: Soft    ASSESSMENT/PLAN:    Excision of sebaceous cyst abdominal wall    Jj Boucher M.D.

## 2022-02-17 NOTE — ANESTHESIA POSTPROCEDURE EVALUATION
Patient: Bridget Oliva    Procedure Summary     Date: 02/17/22 Room / Location:  [facility]  [facility]    Anesthesia Start: 0653 Anesthesia Stop: 0740    Procedure: TRUNK CYST EXCISION (N/A ) Diagnosis:       Sebaceous cyst      (Sebaceous cyst [L72.3])    Surgeons: Jj Boucher MD Provider: Clifton Duran MD    Anesthesia Type: MAC ASA Status: 1          Anesthesia Type: MAC    Vitals  Vitals Value Taken Time   /75 02/17/22 0801   Temp 36.6 °C (97.9 °F) 02/17/22 0738   Pulse 60 02/17/22 0801   Resp 18 02/17/22 0801   SpO2 99 % 02/17/22 0801           Post Anesthesia Care and Evaluation    Patient location during evaluation: bedside  Patient participation: complete - patient participated  Level of consciousness: awake and alert  Pain management: adequate  Airway patency: patent  Anesthetic complications: No anesthetic complications  PONV Status: controlled  Cardiovascular status: blood pressure returned to baseline and acceptable  Respiratory status: acceptable  Hydration status: acceptable

## 2022-02-18 LAB
LAB AP CASE REPORT: NORMAL
PATH REPORT.FINAL DX SPEC: NORMAL
PATH REPORT.GROSS SPEC: NORMAL

## 2022-03-15 ENCOUNTER — PATIENT MESSAGE (OUTPATIENT)
Dept: SURGERY | Facility: CLINIC | Age: 42
End: 2022-03-15

## 2022-03-15 ENCOUNTER — TELEPHONE (OUTPATIENT)
Dept: SURGERY | Facility: CLINIC | Age: 42
End: 2022-03-15

## 2022-03-15 NOTE — TELEPHONE ENCOUNTER
Hub staff attempted to follow warm transfer process and was unsuccessful     Caller: Bridget Oliva A    Relationship to patient: Self    Best call back number: [phone]    Patient is needing: PT HAD SURGERY WITH DR. BAXTER (TRUNK CYST EXCISION ON 2/18/22). THE MASS HAS CAME BACK AND IT RUPTURED THIS MORNING DURING A WORKOUT AND PT NEEDS SOMEONE TO PLEASE CALL HER ASAP.

## 2022-03-15 NOTE — TELEPHONE ENCOUNTER
"I spoke with the patient and she denies any warmth to the area.  I offered her an appointment on Thursday but I believe she would prefer a phone call from you for advice as she kept saying \"So what you're saying is I should just sit on it and wait and I am looking for medical advice\".  I did advise her what I thought it was (suture knot) and gave her recommendations how to treat it and to keep the site covered in the event she has continued drainage/bleeding and I would send her message to you."

## 2022-03-15 NOTE — TELEPHONE ENCOUNTER
Patient also sent [employer] message with picture which has been forwarded on to Dr. Boucher for review.

## 2022-03-16 ENCOUNTER — TELEPHONE (OUTPATIENT)
Dept: SURGERY | Facility: CLINIC | Age: 42
End: 2022-03-16

## 2022-03-16 NOTE — TELEPHONE ENCOUNTER
Ailyn Patterson RegSched Rep     DM    3/16/22 8:43 AM  Note       Caller: Bridget Oliva     Relationship: Self     Best call back number: [phone]        Who are you requesting to speak with (clinical staff, provider,  specific staff member): SUSHIL     Do you know the name of the person who called: DEL OLIVA     What was the call regarding: PT CALLED AND WANTS TO LEAVE MESSAGE FOR SUSHIL AND LET HER KNOW SHE IS FINE, EVERYTHING IS OK AND THANK YOU     Do you require a callback: NO

## 2022-03-16 NOTE — TELEPHONE ENCOUNTER
Caller: Bridget Oliva    Relationship: Self    Best call back number: [phone]      Who are you requesting to speak with (clinical staff, provider,  specific staff member): SUSHIL    Do you know the name of the person who called: DEL OLIVA    What was the call regarding: PT CALLED AND WANTS TO LEAVE MESSAGE FOR SUSHIL AND LET HER KNOW SHE IS FINE, EVERYTHING IS OK AND THANK YOU    Do you require a callback: NO

## 2022-09-14 ENCOUNTER — APPOINTMENT (OUTPATIENT)
Dept: WOMENS IMAGING | Facility: HOSPITAL | Age: 42
End: 2022-09-14

## 2022-09-14 PROCEDURE — 77067 SCR MAMMO BI INCL CAD: CPT | Performed by: RADIOLOGY

## 2022-09-14 PROCEDURE — 77063 BREAST TOMOSYNTHESIS BI: CPT | Performed by: RADIOLOGY

## 2022-09-22 ENCOUNTER — OFFICE VISIT (OUTPATIENT)
Dept: INTERNAL MEDICINE | Age: 42
End: 2022-09-22

## 2022-09-22 ENCOUNTER — PATIENT ROUNDING (BHMG ONLY) (OUTPATIENT)
Dept: INTERNAL MEDICINE | Age: 42
End: 2022-09-22

## 2022-09-22 VITALS
SYSTOLIC BLOOD PRESSURE: 124 MMHG | BODY MASS INDEX: 25.65 KG/M2 | OXYGEN SATURATION: 97 % | WEIGHT: 179.2 LBS | RESPIRATION RATE: 16 BRPM | HEIGHT: 70 IN | HEART RATE: 57 BPM | DIASTOLIC BLOOD PRESSURE: 78 MMHG | TEMPERATURE: 96.6 F

## 2022-09-22 DIAGNOSIS — R35.0 URINARY FREQUENCY: ICD-10-CM

## 2022-09-22 DIAGNOSIS — R79.89 ELEVATED SERUM CREATININE: ICD-10-CM

## 2022-09-22 DIAGNOSIS — Z11.59 ENCOUNTER FOR HEPATITIS C SCREENING TEST FOR LOW RISK PATIENT: ICD-10-CM

## 2022-09-22 DIAGNOSIS — Z76.89 ENCOUNTER TO ESTABLISH CARE: Primary | ICD-10-CM

## 2022-09-22 LAB
BILIRUB BLD-MCNC: NEGATIVE MG/DL
CLARITY, POC: CLEAR
COLOR UR: ABNORMAL
GLUCOSE UR STRIP-MCNC: NEGATIVE MG/DL
KETONES UR QL: NEGATIVE
LEUKOCYTE EST, POC: NEGATIVE
NITRITE UR-MCNC: NEGATIVE MG/ML
PH UR: 5 [PH] (ref 5–8)
PROT UR STRIP-MCNC: NEGATIVE MG/DL
RBC # UR STRIP: NEGATIVE /UL
SP GR UR: 1.03 (ref 1–1.03)
UROBILINOGEN UR QL: ABNORMAL

## 2022-09-22 PROCEDURE — 99214 OFFICE O/P EST MOD 30 MIN: CPT

## 2022-09-22 PROCEDURE — 81002 URINALYSIS NONAUTO W/O SCOPE: CPT

## 2022-09-22 NOTE — PROGRESS NOTES
A My-Chart message has been sent to the patient for PATIENT ROUNDING with [facility]

## 2022-09-22 NOTE — PROGRESS NOTES
"    I N T E R N A L  M E D I C I N E  Marisol Katlin, APRN    ENCOUNTER DATE:  09/22/2022    Bridget Oliva / 42 y.o. / female      CHIEF COMPLAINT / REASON FOR OFFICE VISIT     Abnormal Lab (New pt to be est)      ASSESSMENT & PLAN     Diagnoses and all orders for this visit:    1. Encounter to establish care (Primary)  -     CBC & Differential  -     Hemoglobin A1c  -     Lipid Panel With / Chol / HDL Ratio  -     TSH+Free T4  -     Vitamin D 25 Hydroxy    2. Urinary frequency  -     POC Urinalysis Dipstick    3. Elevated serum creatinine  -     Comprehensive Metabolic Panel  -     Urinalysis With Microscopic If Indicated (No Culture) - Urine, Clean Catch    4. Encounter for hepatitis C screening test for low risk patient  -     Hepatitis C Antibody         SUMMARY/DISCUSSION  • Labs from OBGYN unavailable at today's visit.  She is agreeable to update labs today.    • She was educated on importance of spot checking BP at home over the next week and sending values via [employer] for review.    • She was educated on signs for which she would need to seek care at ER: abnormal weight gain, extremity swelling, diminished urine production.    • Educated on importance of adequate hydration with water at all times, avoiding NSAIDS/ dark sodas.      Next Appointment with me: Visit date not found    Return in about 1 month (around 10/22/2022) for Sign medical release for [facility], annual physical in 1 month.      VITAL SIGNS     Visit Vitals  /78 (BP Location: Left arm, Patient Position: Sitting, Cuff Size: Adult)   Pulse 57   Temp 96.6 °F (35.9 °C) (Temporal)   Resp 16   Ht 177.8 cm (70\")   Wt 81.3 kg (179 lb 3.2 oz)   LMP 09/15/2022   SpO2 97%   BMI 25.71 kg/m²             Wt Readings from Last 3 Encounters:   09/22/22 81.3 kg (179 lb 3.2 oz)   09/18/22 81.6 kg (180 lb)   02/15/22 81.6 kg (180 lb)     Body mass index is 25.71 kg/m².        MEDICATIONS AT THE TIME OF OFFICE VISIT     Current Outpatient Medications on " "File Prior to Visit   Medication Sig Dispense Refill   • [DISCONTINUED] ciprofloxacin (Cipro) 500 MG tablet 1 po q12 6 tablet 0   • [DISCONTINUED] fluticasone (FLONASE) 50 MCG/ACT nasal spray Administer 2 sprays in each nostril once a day 15.8 mL 0     No current facility-administered medications on file prior to visit.        HISTORY OF PRESENT ILLNESS     Followed by Emmie Huitron at [facility].  She recently saw Tomy MONTIEL and was made aware yesterday that her creatinine has been abnormally elevated for the last several years.   Pt does report she has lengthy history of inadequate hydration with water throughout the day.     Colonoscopy done 2021 by Dr. Boucher, recommended repeat in 5 years.  Denies any changes in bowel habits.  Family hx significant for colon CA in dad.      She reports increased urinary urgency and frequency x 5 years.  Denies dysuria.  Has a long history of \"great\" blood pressure, but does not currently check at home.    Denies any weight gain, lower extremity swelling, diminished urine production.      Patient Care Team:  Marisol Dalal APRN as PCP - General (Family Medicine)  Natasha Fink MD as Consulting Physician (Obstetrics and Gynecology)  Emmie Huitron APRN as Nurse Practitioner (Obstetrics)    REVIEW OF SYSTEMS     Review of Systems   Constitutional: Negative for chills, fever and unexpected weight change.   Respiratory: Negative for cough, chest tightness and shortness of breath.    Cardiovascular: Negative for chest pain, palpitations and leg swelling.   Gastrointestinal: Negative for abdominal pain.   Genitourinary: Positive for frequency and urgency. Negative for decreased urine volume, difficulty urinating, dysuria, flank pain, hematuria and pelvic pain.   Neurological: Negative for dizziness, weakness, light-headedness and headaches.   Psychiatric/Behavioral: The patient is not nervous/anxious.           PHYSICAL EXAMINATION     Physical Exam  Vitals reviewed. "   Constitutional:       General: She is not in acute distress.     Appearance: Normal appearance. She is not ill-appearing, toxic-appearing or diaphoretic.   HENT:      Head: Normocephalic and atraumatic.   Cardiovascular:      Rate and Rhythm: Normal rate and regular rhythm.      Heart sounds: Normal heart sounds.   Pulmonary:      Effort: Pulmonary effort is normal.      Breath sounds: Normal breath sounds.   Abdominal:      Palpations: Abdomen is soft.      Tenderness: There is no abdominal tenderness. There is no right CVA tenderness or left CVA tenderness.   Musculoskeletal:      Right lower leg: No edema.      Left lower leg: No edema.   Skin:     General: Skin is warm and dry.   Neurological:      Mental Status: She is alert and oriented to person, place, and time. Mental status is at baseline.   Psychiatric:         Mood and Affect: Mood normal.         Behavior: Behavior normal.         Thought Content: Thought content normal.         Judgment: Judgment normal.           REVIEWED DATA     Labs:           Imaging:            Medical Tests:           Summary of old records / correspondence / consultant report:           Request outside records:

## 2022-09-23 ENCOUNTER — TELEPHONE (OUTPATIENT)
Dept: INTERNAL MEDICINE | Age: 42
End: 2022-09-23

## 2022-09-23 DIAGNOSIS — R82.71 BACTERIA IN URINE: Primary | ICD-10-CM

## 2022-09-23 LAB
25(OH)D3+25(OH)D2 SERPL-MCNC: 61.4 NG/ML (ref 30–100)
ALBUMIN SERPL-MCNC: 4.4 G/DL (ref 3.5–5.2)
ALBUMIN/GLOB SERPL: 2.1 G/DL
ALP SERPL-CCNC: 60 U/L (ref 39–117)
ALT SERPL-CCNC: 17 U/L (ref 1–33)
APPEARANCE UR: CLEAR
AST SERPL-CCNC: 18 U/L (ref 1–32)
BACTERIA #/AREA URNS HPF: ABNORMAL /HPF
BASOPHILS # BLD AUTO: 0.03 10*3/MM3 (ref 0–0.2)
BASOPHILS NFR BLD AUTO: 0.5 % (ref 0–1.5)
BILIRUB SERPL-MCNC: 0.4 MG/DL (ref 0–1.2)
BILIRUB UR QL STRIP: NEGATIVE
BUN SERPL-MCNC: 11 MG/DL (ref 6–20)
BUN/CREAT SERPL: 9.6 (ref 7–25)
CALCIUM SERPL-MCNC: 9.1 MG/DL (ref 8.6–10.5)
CASTS URNS MICRO: ABNORMAL
CHLORIDE SERPL-SCNC: 107 MMOL/L (ref 98–107)
CHOLEST SERPL-MCNC: 158 MG/DL (ref 0–200)
CHOLEST/HDLC SERPL: 2.32 {RATIO}
CO2 SERPL-SCNC: 27.1 MMOL/L (ref 22–29)
COLOR UR: YELLOW
CREAT SERPL-MCNC: 1.14 MG/DL (ref 0.57–1)
EGFRCR SERPLBLD CKD-EPI 2021: 62.2 ML/MIN/1.73
EOSINOPHIL # BLD AUTO: 0.09 10*3/MM3 (ref 0–0.4)
EOSINOPHIL NFR BLD AUTO: 1.4 % (ref 0.3–6.2)
EPI CELLS #/AREA URNS HPF: ABNORMAL /HPF
ERYTHROCYTE [DISTWIDTH] IN BLOOD BY AUTOMATED COUNT: 12.5 % (ref 12.3–15.4)
GLOBULIN SER CALC-MCNC: 2.1 GM/DL
GLUCOSE SERPL-MCNC: 69 MG/DL (ref 65–99)
GLUCOSE UR QL STRIP: NEGATIVE
HBA1C MFR BLD: 4.9 % (ref 4.8–5.6)
HCT VFR BLD AUTO: 37.4 % (ref 34–46.6)
HCV AB S/CO SERPL IA: <0.1 S/CO RATIO (ref 0–0.9)
HDLC SERPL-MCNC: 68 MG/DL (ref 40–60)
HGB BLD-MCNC: 12.6 G/DL (ref 12–15.9)
HGB UR QL STRIP: NEGATIVE
IMM GRANULOCYTES # BLD AUTO: 0.02 10*3/MM3 (ref 0–0.05)
IMM GRANULOCYTES NFR BLD AUTO: 0.3 % (ref 0–0.5)
KETONES UR QL STRIP: NEGATIVE
LDLC SERPL CALC-MCNC: 81 MG/DL (ref 0–100)
LEUKOCYTE ESTERASE UR QL STRIP: ABNORMAL
LYMPHOCYTES # BLD AUTO: 1.38 10*3/MM3 (ref 0.7–3.1)
LYMPHOCYTES NFR BLD AUTO: 21.1 % (ref 19.6–45.3)
MCH RBC QN AUTO: 30.4 PG (ref 26.6–33)
MCHC RBC AUTO-ENTMCNC: 33.7 G/DL (ref 31.5–35.7)
MCV RBC AUTO: 90.3 FL (ref 79–97)
MONOCYTES # BLD AUTO: 0.46 10*3/MM3 (ref 0.1–0.9)
MONOCYTES NFR BLD AUTO: 7 % (ref 5–12)
NEUTROPHILS # BLD AUTO: 4.57 10*3/MM3 (ref 1.7–7)
NEUTROPHILS NFR BLD AUTO: 69.7 % (ref 42.7–76)
NITRITE UR QL STRIP: NEGATIVE
NRBC BLD AUTO-RTO: 0 /100 WBC (ref 0–0.2)
PH UR STRIP: 5.5 [PH] (ref 5–8)
PLATELET # BLD AUTO: 209 10*3/MM3 (ref 140–450)
POTASSIUM SERPL-SCNC: 4.4 MMOL/L (ref 3.5–5.2)
PROT SERPL-MCNC: 6.5 G/DL (ref 6–8.5)
PROT UR QL STRIP: NEGATIVE
RBC # BLD AUTO: 4.14 10*6/MM3 (ref 3.77–5.28)
RBC #/AREA URNS HPF: ABNORMAL /HPF
SODIUM SERPL-SCNC: 145 MMOL/L (ref 136–145)
SP GR UR STRIP: 1.02 (ref 1–1.03)
T4 FREE SERPL-MCNC: 1.42 NG/DL (ref 0.93–1.7)
TRIGL SERPL-MCNC: 40 MG/DL (ref 0–150)
TSH SERPL DL<=0.005 MIU/L-ACNC: 2.29 UIU/ML (ref 0.27–4.2)
UROBILINOGEN UR STRIP-MCNC: ABNORMAL MG/DL
VLDLC SERPL CALC-MCNC: 9 MG/DL (ref 5–40)
WBC # BLD AUTO: 6.55 10*3/MM3 (ref 3.4–10.8)
WBC #/AREA URNS HPF: ABNORMAL /HPF

## 2022-09-23 NOTE — TELEPHONE ENCOUNTER
----- Message from TIANA Mcnulty sent at 9/23/2022  9:02 AM EDT -----  CBC showed no infection or anemia.    CMP showed mildly elevated creatinine.  As discussed, it is important to ensure adequate hydration with water on a daily basis.  Avoid all NSAIDS.    Hemoglobin A1C was normal, meaning you are not diabetic.    Cholesterol labs looked great.     Vitamin D level was normal.    Urinalysis showed some bacteria under the microscopic.  Recommend scheduling lab only appointment to obtain a urine culture to determine what type of bacteria is present.  Please ensure you provide clean catch sample.

## 2022-09-23 NOTE — TELEPHONE ENCOUNTER
KARINATVM INSTRUCTING PT TO RETURN CALL TO BE READ LAB RESULTS. LETTER SENT VIA [employer]/MAILED. MM

## 2022-10-26 ENCOUNTER — OFFICE VISIT (OUTPATIENT)
Dept: INTERNAL MEDICINE | Age: 42
End: 2022-10-26

## 2022-10-26 VITALS
HEIGHT: 70 IN | WEIGHT: 181 LBS | OXYGEN SATURATION: 98 % | DIASTOLIC BLOOD PRESSURE: 80 MMHG | TEMPERATURE: 97.7 F | HEART RATE: 78 BPM | BODY MASS INDEX: 25.91 KG/M2 | SYSTOLIC BLOOD PRESSURE: 120 MMHG

## 2022-10-26 DIAGNOSIS — R10.9 ABDOMINAL CRAMPING: ICD-10-CM

## 2022-10-26 DIAGNOSIS — Z12.83 SKIN EXAM, SCREENING FOR CANCER: ICD-10-CM

## 2022-10-26 DIAGNOSIS — F41.9 ANXIETY AND DEPRESSION: ICD-10-CM

## 2022-10-26 DIAGNOSIS — Z23 ENCOUNTER FOR IMMUNIZATION: ICD-10-CM

## 2022-10-26 DIAGNOSIS — Z00.00 ANNUAL PHYSICAL EXAM: Primary | ICD-10-CM

## 2022-10-26 DIAGNOSIS — N92.1 MENORRHAGIA WITH IRREGULAR CYCLE: ICD-10-CM

## 2022-10-26 DIAGNOSIS — F32.A ANXIETY AND DEPRESSION: ICD-10-CM

## 2022-10-26 DIAGNOSIS — Z23 FLU VACCINE NEED: ICD-10-CM

## 2022-10-26 DIAGNOSIS — R79.89 ELEVATED SERUM CREATININE: ICD-10-CM

## 2022-10-26 LAB
BUN SERPL-MCNC: 8 MG/DL (ref 6–20)
BUN/CREAT SERPL: 7.1 (ref 7–25)
CALCIUM SERPL-MCNC: 9.5 MG/DL (ref 8.6–10.5)
CHLORIDE SERPL-SCNC: 105 MMOL/L (ref 98–107)
CO2 SERPL-SCNC: 24.9 MMOL/L (ref 22–29)
CREAT SERPL-MCNC: 1.13 MG/DL (ref 0.57–1)
EGFRCR SERPLBLD CKD-EPI 2021: 62.8 ML/MIN/1.73
GLUCOSE SERPL-MCNC: 77 MG/DL (ref 65–99)
POTASSIUM SERPL-SCNC: 4.7 MMOL/L (ref 3.5–5.2)
SODIUM SERPL-SCNC: 141 MMOL/L (ref 136–145)

## 2022-10-26 PROCEDURE — 90686 IIV4 VACC NO PRSV 0.5 ML IM: CPT

## 2022-10-26 PROCEDURE — 90472 IMMUNIZATION ADMIN EACH ADD: CPT

## 2022-10-26 PROCEDURE — 90471 IMMUNIZATION ADMIN: CPT

## 2022-10-26 PROCEDURE — 99396 PREV VISIT EST AGE 40-64: CPT

## 2022-10-26 PROCEDURE — 99214 OFFICE O/P EST MOD 30 MIN: CPT

## 2022-10-26 PROCEDURE — 90715 TDAP VACCINE 7 YRS/> IM: CPT

## 2022-10-26 RX ORDER — CITALOPRAM 20 MG/1
20 TABLET ORAL DAILY
Qty: 30 TABLET | Refills: 0 | Status: SHIPPED | OUTPATIENT
Start: 2022-10-26 | End: 2022-11-23 | Stop reason: SDUPTHER

## 2022-10-26 NOTE — PROGRESS NOTES
"    I N T E R N A L  M E D I C I N E  Marisol Dalal, APRN      ENCOUNTER DATE:  10/26/2022    Bridget HOPKINS Chrissysallyvarsha / 42 y.o. / female      CHIEF COMPLAINT     Annual Exam    She is followed by OBGYN, Dr. Huitron at [facility] for paps/ mammograms.  Up to date on both, per pt.  She is looking to re establish care with new OBGYN.  She reports over the last 3 months she has developed heavier menstrual cycles than her normal, along with extended periods of lower abdominal cramping.   She is concerned whether this could be the start of perimenopause.      Colonoscopy due in 2026.    Recently started seeing a counselor for anxiety x 6 months.  Lifelong problem, but has had more severe worrying thoughts and sleep difficulty recently.  Was diagnosed with anxiety and depression as a teenager and was on Zoloft at that time, but she cannot recall if she had any benefit from medication.  ANTIONETTE-7 score of 18, PHQ-9 score of 12.  No SI/ HI.          VITALS     Visit Vitals  /80   Pulse 78   Temp 97.7 °F (36.5 °C)   Ht 177.8 cm (70\")   Wt 82.1 kg (181 lb)   LMP 10/12/2022   SpO2 98%   BMI 25.97 kg/m²       BP Readings from Last 3 Encounters:   10/26/22 120/80   09/22/22 124/78   09/18/22 129/93     Wt Readings from Last 3 Encounters:   10/26/22 82.1 kg (181 lb)   09/22/22 81.3 kg (179 lb 3.2 oz)   09/18/22 81.6 kg (180 lb)      Body mass index is 25.97 kg/m².       MEDICATIONS     No current outpatient medications on file prior to visit.     No current facility-administered medications on file prior to visit.         HISTORY OF PRESENT ILLNESS     Bridget presents for annual health maintenance visit.    · General health: excellent  · Lifestyle:  · Attempting to lose weight?: Yes   · Diet: eats a well balanced, healthy diet  · Exercise: exercises 3 days/week, weights/ cardio  · Tobacco: Remote history (short-term)   · Alcohol: 2-3 occasions/week  · Work: Full-time  · Reproductive health:  · Sexually active?: No   · Sexual problems?: No " problems  · Concern for STD?: No    · Sees Gynecologist?: Yes   · Jennie/Postmenopausal?: No   · Domestic abuse concerns: No   · Depression Screening:      PHQ-2/PHQ-9 Depression Screening 10/26/2022   Little Interest or Pleasure in Doing Things 0-->not at all   Feeling Down, Depressed or Hopeless 0-->not at all   PHQ-9: Brief Depression Severity Measure Score 0         PHQ-2: 0 (Not depressed)   PHQ-9: 0 (Negative screening for depression)    Patient Care Team:  Marisol Dalal APRN as PCP - General (Family Medicine)  Natasha Fink MD as Consulting Physician (Obstetrics and Gynecology)  Emmie Huitron APRN as Nurse Practitioner (Obstetrics)      ALLERGIES  No Known Allergies     PFSH:     The following portions of the patient's history were reviewed and updated as appropriate: Allergies / Current Medications / Past Medical History / Surgical History / Social History / Family History    PROBLEM LIST   Patient Active Problem List   Diagnosis   • Family hx of colon cancer   • Sebaceous cyst   • Annual physical exam       PAST MEDICAL HISTORY  Past Medical History:   Diagnosis Date   • Anxiety    • Family history of colon cancer    • Skin cyst        SURGICAL HISTORY  Past Surgical History:   Procedure Laterality Date   • COLONOSCOPY N/A 9/1/2021    Procedure: COLONOSCOPY TO CECUM WITH HOT POLYPECTOMIES;  Surgeon: Jj Boucher MD;  Location: [facility] ENDOSCOPY;  Service: General;  Laterality: N/A;  FAMILY HISTORY OF COLON CANCER  COLON POLYPS   • EXCISION MASS TRUNK N/A 2/17/2022    Procedure: TRUNK CYST EXCISION;  Surgeon: Jj Boucher MD;  Location: [facility] OR [facility];  Service: General;  Laterality: N/A;   • INCISION AND DRAINAGE ABSCESS Left 12/03/2020    IN-OFFICE PROCEDURE: Incision and drainage of infected sebaceous cyst left upper quadrant abdominal wall - Dr. Jj Boucher       SOCIAL HISTORY  Social History     Socioeconomic History   • Marital status: Single   Tobacco Use   • Smoking status: Never    • Smokeless tobacco: Never   Vaping Use   • Vaping Use: Never used   Substance and Sexual Activity   • Alcohol use: Yes     Alcohol/week: 3.0 - 5.0 standard drinks     Types: 3 - 5 Standard drinks or equivalent per week     Comment: weekly   • Drug use: Never   • Sexual activity: Defer       FAMILY HISTORY  Family History   Problem Relation Age of Onset   • Breast cancer Mother    • Skin cancer Mother    • Colon cancer Father 59   • Diabetes type II Maternal Grandfather    • Heart failure Paternal Grandmother    • Malig Hyperthermia Neg Hx        IMMUNIZATION HISTORY  Immunization History   Administered Date(s) Administered   • COVID-19 (PFIZER) PURPLE CAP 09/22/2021   • FluLaval/Fluzone >6mos 10/26/2022   • Tdap 10/26/2022   • flucelvax quad pfs =>4 YRS 10/23/2020         REVIEW OF SYSTEMS     Review of Systems   Constitutional: Negative for chills, fever and unexpected weight change.   Respiratory: Negative for cough, chest tightness and shortness of breath.    Cardiovascular: Negative for chest pain, palpitations and leg swelling.   Neurological: Negative for dizziness, weakness, light-headedness and headaches.   Psychiatric/Behavioral: The patient is not nervous/anxious.          PHYSICAL EXAMINATION     Physical Exam  Vitals reviewed.   Constitutional:       General: She is not in acute distress.     Appearance: Normal appearance. She is not ill-appearing, toxic-appearing or diaphoretic.   HENT:      Head: Normocephalic and atraumatic.   Cardiovascular:      Rate and Rhythm: Normal rate and regular rhythm.      Heart sounds: Normal heart sounds.   Pulmonary:      Effort: Pulmonary effort is normal.      Breath sounds: Normal breath sounds.   Neurological:      Mental Status: She is alert and oriented to person, place, and time. Mental status is at baseline.   Psychiatric:         Mood and Affect: Mood normal.         Behavior: Behavior normal.         Thought Content: Thought content normal.         Judgment:  Judgment normal.         REVIEWED DATA      Labs:    Lab Results   Component Value Date     09/22/2022    K 4.4 09/22/2022    CALCIUM 9.1 09/22/2022    AST 18 09/22/2022    ALT 17 09/22/2022    BUN 11 09/22/2022    CREATININE 1.14 (H) 09/22/2022       Lab Results   Component Value Date    GLUCOSE 69 09/22/2022    HGBA1C 4.90 09/22/2022    TSH 2.290 09/22/2022    FREET4 1.42 09/22/2022       Lab Results   Component Value Date    LDL 81 09/22/2022    HDL 68 (H) 09/22/2022    TRIG 40 09/22/2022    CHOLHDLRATIO 2.32 09/22/2022       Lab Results   Component Value Date    NKWW77CH 61.4 09/22/2022        Lab Results   Component Value Date    WBC 6.55 09/22/2022    HGB 12.6 09/22/2022    MCV 90.3 09/22/2022     09/22/2022       Lab Results   Component Value Date    PROTEIN Negative 09/22/2022    GLUCOSEU Negative 09/22/2022    BLOODU Negative 09/22/2022    NITRITEU Negative 09/22/2022    LEUKOCYTESUR Negative 09/22/2022          Lab Results   Component Value Date    HEPCVIRUSABY <0.1 09/22/2022       Imaging:        Medical Tests:        ASSESSMENT & PLAN     ANNUAL WELLNESS EXAM / PHYSICAL     Diagnoses and all orders for this visit:    1. Annual physical exam (Primary)    2. Menorrhagia with irregular cycle  -     US pelvis complete; Future    3. Abdominal cramping  -     Cancel: Ambulatory Referral to Gynecology  -     Ambulatory Referral to Gynecology  -     US pelvis complete; Future    4. Anxiety and depression  -     citalopram (CeleXA) 20 MG tablet; Take 1 tablet by mouth Daily.  Dispense: 30 tablet; Refill: 0    5. Elevated serum creatinine  -     Basic Metabolic Panel    6. Skin exam, screening for cancer  -     Ambulatory Referral to Dermatology    7. Flu vaccine need  -     FluLaval/Fluarix/Fluzone >6 Months    8. Encounter for immunization  -     Tdap Vaccine Greater Than or Equal To 6yo IM         Summary/Discussion:     • Discussed risks/ benefits/ side effects with starting SSRI.  She would like  to proceed.  Will start citalopram 10 mg x 4 days, and if side effects are tolerable, increase to to 20 mg daily.  She is aware to visit ER for any thoughts of SI/ HI.  Continue with counseling.    Next Appointment with me: Visit date not found    Return in about 4 weeks (around 11/23/2022) for Recheck Anxiety/ Depression + 1 year annual physical.      HEALTHCARE MAINTENANCE ISSUES     Cancer Screening:  · Colon: Initial/Next screening at age: CURRENT  · Repeat colon cancer screening: every 5 years  · Breast: Recommended monthly self exams; annual professional exam  · Mammogram: every 1 year  · Cervical: 1 year  · Skin: Monthly self skin examination, annual exam by health professional  · Lung: Does not meet criteria for lung cancer screening.   · Other:    Screening Labs & Tests:  · Lab results reviewed & discussed with the patient or test orders placed today.  · EKG:  · CV Screening: Lipid panel  · DEXA (65+ or postmenopausal with risk factors):   · HEP C (If born [birth year]251[birth year], or risk factors): Previously had negative screen  · Other:     Immunization/Vaccinations (to be given today unless deferred by patient)  · Influenza: Give flu shot today  · Hepatitis A: Verify immunization records  · Hepatitis B: Verify immunization records  · Tetanus/Pertussis: Administer today  · Pneumovax: Not needed at this time  · Shingles: Not needed at this time  · COVID: Had 1st shot, will get 2nd shot later    Lifestyle Counseling:  · Lifestyle Modifications: Continue good lifestyle choices/modifications and Discussed better management of stress/anxiety  · Safety Issues: Always wear seatbelt, Avoid texting while driving   · Use sunscreen, regular skin examination  · Recommended annual dental/vision examination.  · Emotional/Stress/Sleep: Reviewed and  given when appropriate      Health Maintenance   Topic Date Due   • PAP SMEAR  Never done   • COVID-19 Vaccine (2 - Pfizer series) 10/13/2021   • ANNUAL PHYSICAL  10/27/2023   •  COLORECTAL CANCER SCREENING  09/01/2026   • TDAP/TD VACCINES (2 - Td or Tdap) 10/26/2032   • HEPATITIS C SCREENING  Completed   • INFLUENZA VACCINE  Completed   • Pneumococcal Vaccine 0-64  Aged Out

## 2022-10-27 DIAGNOSIS — R79.89 ELEVATED SERUM CREATININE: Primary | ICD-10-CM

## 2022-11-18 ENCOUNTER — HOSPITAL ENCOUNTER (OUTPATIENT)
Dept: ULTRASOUND IMAGING | Facility: HOSPITAL | Age: 42
Discharge: HOME | End: 2022-11-18

## 2022-11-18 DIAGNOSIS — R10.9 ABDOMINAL CRAMPING: ICD-10-CM

## 2022-11-18 DIAGNOSIS — N92.1 MENORRHAGIA WITH IRREGULAR CYCLE: ICD-10-CM

## 2022-11-18 DIAGNOSIS — R79.89 ELEVATED SERUM CREATININE: ICD-10-CM

## 2022-11-18 PROCEDURE — 93976 VASCULAR STUDY: CPT

## 2022-11-18 PROCEDURE — 76856 US EXAM PELVIC COMPLETE: CPT

## 2022-11-18 PROCEDURE — 76830 TRANSVAGINAL US NON-OB: CPT

## 2022-11-18 PROCEDURE — 76775 US EXAM ABDO BACK WALL LIM: CPT

## 2022-11-23 ENCOUNTER — TELEPHONE (OUTPATIENT)
Dept: INTERNAL MEDICINE | Age: 42
End: 2022-11-23

## 2022-11-23 ENCOUNTER — OFFICE VISIT (OUTPATIENT)
Dept: INTERNAL MEDICINE | Age: 42
End: 2022-11-23

## 2022-11-23 VITALS
DIASTOLIC BLOOD PRESSURE: 78 MMHG | BODY MASS INDEX: 25.91 KG/M2 | WEIGHT: 181 LBS | OXYGEN SATURATION: 99 % | SYSTOLIC BLOOD PRESSURE: 118 MMHG | HEIGHT: 70 IN | HEART RATE: 69 BPM | RESPIRATION RATE: 16 BRPM

## 2022-11-23 DIAGNOSIS — F41.9 ANXIETY AND DEPRESSION: Primary | ICD-10-CM

## 2022-11-23 DIAGNOSIS — F32.A ANXIETY AND DEPRESSION: Primary | ICD-10-CM

## 2022-11-23 DIAGNOSIS — R19.6 BAD BREATH: ICD-10-CM

## 2022-11-23 PROCEDURE — 99214 OFFICE O/P EST MOD 30 MIN: CPT

## 2022-11-23 RX ORDER — CITALOPRAM 10 MG/1
10 TABLET ORAL DAILY
Qty: 90 TABLET | Refills: 0 | Status: SHIPPED | OUTPATIENT
Start: 2022-11-23 | End: 2023-04-04

## 2022-11-23 NOTE — TELEPHONE ENCOUNTER
----- Message from TIANA Mcnulty sent at 11/23/2022 10:57 AM EST -----  Please call pt.    Kidney ultrasound was normal.    We could consider follow up with nephrologist (kidney specialist) to investigate further.  Please let me know if you would like referral.

## 2022-11-23 NOTE — TELEPHONE ENCOUNTER
msg sent to pt via [employer]. LVM that I would be sending mychart msg    ----- Message from TIANA Mcnulty sent at 11/23/2022  3:06 PM EST -----  Please call pt.    Pelvic ultrasound showed mildly heterogeneous echotexture of the uterus without evidence for discrete mass, meaning the appearance of the uterus was not entirely uniform but no masses were identified.  Would recommend scheduling an appointment with your OBGYN, Dr. Fink, to discuss and investigate these findings further.  Both ovaries appeared to be normal.

## 2022-11-23 NOTE — PROGRESS NOTES
"    I N T E R N A L  M E D I C I N E  Marisol Dalal, APRN    ENCOUNTER DATE:  11/23/2022    Bridget Oliva / 42 y.o. / female      CHIEF COMPLAINT / REASON FOR OFFICE VISIT     Anxiety      ASSESSMENT & PLAN     Diagnoses and all orders for this visit:    1. Anxiety and depression (Primary)  -     citalopram (CeleXA) 10 MG tablet; Take 1 tablet by mouth Daily.  Dispense: 90 tablet; Refill: 0    2. Bad breath         SUMMARY/DISCUSSION  • Will continue citalopram 10 mg daily.  Continue counseling.  Visit ER for any SI/ HI.  • Educated that she can consider taking omeperazole 20 mg once daily x 30 days, and assess for symptom improvement.  If no improvement, pt to reach out via [employer] to provide update on condition.      Next Appointment with me: Visit date not found    Return in about 11 months (around 10/27/2023) for Annual physical.      VITAL SIGNS     Visit Vitals  /78 (BP Location: Left arm, Patient Position: Sitting, Cuff Size: Adult)   Pulse 69   Resp 16   Ht 177.8 cm (70\")   Wt 82.1 kg (181 lb)   SpO2 99%   BMI 25.97 kg/m²             Wt Readings from Last 3 Encounters:   11/23/22 82.1 kg (181 lb)   10/26/22 82.1 kg (181 lb)   09/22/22 81.3 kg (179 lb 3.2 oz)     Body mass index is 25.97 kg/m².        MEDICATIONS AT THE TIME OF OFFICE VISIT     Current Outpatient Medications on File Prior to Visit   Medication Sig Dispense Refill   • [DISCONTINUED] citalopram (CeleXA) 20 MG tablet Take 1 tablet by mouth Daily. 30 tablet 0     No current facility-administered medications on file prior to visit.        HISTORY OF PRESENT ILLNESS     Is taking Citalopram 10 mg daily x 4 weeks with symptom benefit.  She did not increase dose to 20 mg daily because she saw significant benefit with 10 mg daily.  Feels less worry; no longer feels as though she is \"on edge\" all the time.  No SI/ HI.      ANTIONETTE-7 Anxiety Score Now 4, formerly 18  PHQ-9 Depression Score Now 4, formerly 12    Continues to attend counseling.    She " reports many years of bad breath.  She is up to date on the dentist, and they have not identified any particular reason for bad breath.  Does have a prior hx of GERD but this is well controlled and she denies any symptoms of acid reflux, dysphagia or early satiety.        Patient Care Team:  Marisol Dalal APRN as PCP - General (Family Medicine)  Natasha Fink MD as Consulting Physician (Obstetrics and Gynecology)  Emmie Huitron APRN as Nurse Practitioner (Obstetrics)    REVIEW OF SYSTEMS     Review of Systems   Constitutional: Negative for chills, fever and unexpected weight change.   Respiratory: Negative for cough, chest tightness and shortness of breath.    Cardiovascular: Negative for chest pain, palpitations and leg swelling.   Neurological: Negative for dizziness, weakness, light-headedness and headaches.   Psychiatric/Behavioral: Negative for dysphoric mood, self-injury and suicidal ideas. The patient is not nervous/anxious.           PHYSICAL EXAMINATION     Physical Exam  Vitals reviewed.   Constitutional:       General: She is not in acute distress.     Appearance: Normal appearance. She is not ill-appearing, toxic-appearing or diaphoretic.   HENT:      Head: Normocephalic and atraumatic.   Cardiovascular:      Rate and Rhythm: Normal rate and regular rhythm.      Heart sounds: Normal heart sounds.   Pulmonary:      Effort: Pulmonary effort is normal.      Breath sounds: Normal breath sounds.   Neurological:      Mental Status: She is alert and oriented to person, place, and time. Mental status is at baseline.   Psychiatric:         Mood and Affect: Mood normal.         Behavior: Behavior normal.         Thought Content: Thought content normal.         Judgment: Judgment normal.           REVIEWED DATA     Labs:           Imaging:            Medical Tests:           Summary of old records / correspondence / consultant report:           Request outside records:

## 2022-11-23 NOTE — TELEPHONE ENCOUNTER
HUB MAY READ TO PT    ----- Message from TIANA Mcnulty sent at 11/23/2022 10:57 AM EST -----  Please call pt.    Kidney ultrasound was normal.    We could consider follow up with nephrologist (kidney specialist) to investigate further.  Please let me know if you would like referral.

## 2023-01-31 ENCOUNTER — OFFICE VISIT (OUTPATIENT)
Dept: OBSTETRICS AND GYNECOLOGY | Age: 43
End: 2023-01-31
Payer: COMMERCIAL

## 2023-01-31 VITALS
BODY MASS INDEX: 26.2 KG/M2 | HEIGHT: 70 IN | WEIGHT: 183 LBS | DIASTOLIC BLOOD PRESSURE: 74 MMHG | SYSTOLIC BLOOD PRESSURE: 128 MMHG

## 2023-01-31 DIAGNOSIS — Z86.19 H/O COLD SORES: ICD-10-CM

## 2023-01-31 DIAGNOSIS — N94.6 DYSMENORRHEA: Primary | ICD-10-CM

## 2023-01-31 DIAGNOSIS — G47.00 INSOMNIA, UNSPECIFIED TYPE: ICD-10-CM

## 2023-01-31 DIAGNOSIS — Z80.3 FAMILY HISTORY OF BREAST CANCER: ICD-10-CM

## 2023-01-31 DIAGNOSIS — Z80.0 FAMILY HX OF COLON CANCER: ICD-10-CM

## 2023-01-31 DIAGNOSIS — N92.1 MENORRHAGIA WITH IRREGULAR CYCLE: ICD-10-CM

## 2023-01-31 DIAGNOSIS — N92.6 IRREGULAR MENSES: ICD-10-CM

## 2023-01-31 PROBLEM — Z00.00 ANNUAL PHYSICAL EXAM: Status: RESOLVED | Noted: 2022-10-26 | Resolved: 2023-01-31

## 2023-01-31 PROCEDURE — 99204 OFFICE O/P NEW MOD 45 MIN: CPT | Performed by: OBSTETRICS & GYNECOLOGY

## 2023-01-31 RX ORDER — VALACYCLOVIR HYDROCHLORIDE 1 G/1
TABLET, FILM COATED ORAL
COMMUNITY
Start: 2022-12-05

## 2023-01-31 NOTE — PROGRESS NOTES
Subjective       History of Present Illness  Bridget Oliva is a 42 y.o. female is being seen today for multiple medical and potential gynecologic problems    Patient is 42 years old and has increasing problems with night sweats, sleeplessness, weight gain, irregular menstrual cycles over the past several months.  Additionally she has some dysmenorrhea associated with some of her cycles.    Patient has been evaluated without much lab or imaging in another practice recently.  She has not been on birth control pills for 18 years she is not sexually active, declined any STD screening.    Overall she is relatively healthy person she works out regularly, her PCP has been following some renal functions.  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  She is not taking any herbs or supplements.      Chief Complaint   Patient presents with   • Gynecologic Exam      Last pap about 6 mos ago at [facility] (possible 1 abn pap 15+ years ago in [place]), last mg about 6 months ago    • Consult     Pt thinks she may be starting to have menopause symptoms:  C/o worsening cramps, irregular periods (she will start bleeding, then stop, then start again 5-6 days later), sweats, weight, urination in middle of night   .        The following portions of the patient's history were reviewed and updated as appropriate: allergies, current medications, past family history, past medical history, past social history, past surgical history and problem list.    PAST MEDICAL HISTORY  Past Medical History:   Diagnosis Date   • Anxiety    • Family history of colon cancer    • Skin cyst      OB History    Para Term  AB Living   0 0 0 0 0 0   SAB IAB Ectopic Molar Multiple Live Births   0 0 0 0 0 0     Past Surgical History:   Procedure Laterality Date   • COLONOSCOPY N/A 2021    Procedure: COLONOSCOPY TO CECUM WITH HOT POLYPECTOMIES;  Surgeon: Sander  MD Jj;  Location: [facility] ENDOSCOPY;  Service: General;  Laterality: N/A;  FAMILY HISTORY OF COLON CANCER  COLON POLYPS   • EXCISION MASS TRUNK N/A 2/17/2022    Procedure: TRUNK CYST EXCISION;  Surgeon: Jj Boucher MD;  [place] [facility] [place] [facility] [place]  Service: General;  Laterality: N/A;   • INCISION AND DRAINAGE ABSCESS Left 12/03/2020    IN-OFFICE PROCEDURE: Incision and drainage of infected sebaceous cyst left upper quadrant abdominal wall - Dr. Jj Boucher     Family History   Problem Relation Age of Onset   • Breast cancer Mother    • Skin cancer Mother    • Colon cancer Father 59   • Diabetes type II Maternal Grandfather    • Heart failure Paternal Grandmother    • Malig Hyperthermia Neg Hx      Social History     Tobacco Use   Smoking Status Never   Smokeless Tobacco Never       Current Outpatient Medications:   •  citalopram (CeleXA) 10 MG tablet, Take 1 tablet by mouth Daily., Disp: 90 tablet, Rfl: 0  •  valACYclovir (VALTREX) 1000 MG tablet, , Disp: , Rfl:   Immunization History   Administered Date(s) Administered   • COVID-19 (PFIZER) PURPLE CAP 09/22/2021   • FluLaval/Fluzone >6mos 10/26/2022   • Influenza, Unspecified 10/26/2022   • Tdap 10/26/2022   • flucelvax quad pfs =>4 YRS 10/23/2020       Review of Systems       Except as outlined in history of physical illness, patient denies any changes in her GYN, , GI systems. All other systems reviewed are negative.    Objective   Physical Exam   Alert and oriented, respirations unlabored, heart regular rate and rhythm   Pelvic external genitalia female vagina clean dry intact some blood noted at the os as patient is on her menses uterus is difficult to palpate but does not feel enlarged adnexa nonenlarged nontender rectal exam confirms  Breast are even and symmetric without galactorrhea discharge lymphadenopathy or erythema or masses.  Thyroid is nonenlarged  Heart regular rate and rhythm  Extremity is normal        Assessment & Plan   Diagnoses  and all orders for this visit:    1. Dysmenorrhea (Primary)  -     TSH; Future  -     Follicle Stimulating Hormone; Future  -     Estradiol; Future  -     Comprehensive Metabolic Panel; Future    2. Irregular menses  -     TSH; Future  -     Follicle Stimulating Hormone; Future  -     Estradiol; Future  -     Comprehensive Metabolic Panel; Future    3. Menorrhagia with irregular cycle  -     TSH; Future  -     Follicle Stimulating Hormone; Future  -     Estradiol; Future  -     Comprehensive Metabolic Panel; Future    4. Insomnia, unspecified type    5. Family history of breast cancer    6. Family hx of colon cancer    7. H/O cold sores    Patient is frustrated with her weight gain sleeplessness and change in cycles as outlined above.  Exam was overall reassuring today.  We will check some labs including TSH FSH estradiol and CMP.  We will schedule her for a pelvic ultrasound and after reviewing all of those results determine what is the best next step.  She certainly sounds like she has some estrogen imbalance with her night sweats sleeplessness and change in cycles.  We will be interested to see what her estradiol FSH and thyroid look like.  \  Patient might have to get labs drawn at the same time as her ultrasound.             Orders Placed This Encounter   Procedures   • TSH     Standing Status:   Future     Standing Expiration Date:   1/31/2024     Order Specific Question:   Release to patient     Answer:   Routine Release   • Follicle Stimulating Hormone     Standing Status:   Future     Standing Expiration Date:   1/31/2024     Order Specific Question:   Release to patient     Answer:   Routine Release   • Estradiol     Standing Status:   Future     Standing Expiration Date:   1/31/2024     Order Specific Question:   Release to patient     Answer:   Routine Release   • Comprehensive Metabolic Panel     Standing Status:   Future     Standing Expiration Date:   1/31/2024     Order Specific Question:   Release  to patient     Answer:   Routine Release           EMR Dragon/ Transcription disclaimer:  Much of the encounter note is an electronic transcription/translation of spoken language to printed text. The electronic translation of spoken language may permit erroneous, or at times, nonessential words or phrases to be inadvertently transcribes; Although i have reviewed the note for such errors, some may still exist.

## 2023-02-01 ENCOUNTER — PATIENT ROUNDING (BHMG ONLY) (OUTPATIENT)
Dept: OBSTETRICS AND GYNECOLOGY | Age: 43
End: 2023-02-01
Payer: COMMERCIAL

## 2023-02-14 ENCOUNTER — OFFICE VISIT (OUTPATIENT)
Dept: OBSTETRICS AND GYNECOLOGY | Age: 43
End: 2023-02-14
Payer: COMMERCIAL

## 2023-02-14 VITALS
HEIGHT: 70 IN | DIASTOLIC BLOOD PRESSURE: 76 MMHG | WEIGHT: 182.8 LBS | SYSTOLIC BLOOD PRESSURE: 118 MMHG | BODY MASS INDEX: 26.17 KG/M2

## 2023-02-14 DIAGNOSIS — N92.6 IRREGULAR MENSES: Primary | ICD-10-CM

## 2023-02-14 PROCEDURE — 99213 OFFICE O/P EST LOW 20 MIN: CPT | Performed by: OBSTETRICS & GYNECOLOGY

## 2023-02-14 RX ORDER — LEVONORGESTREL AND ETHINYL ESTRADIOL 0.15-0.03
1 KIT ORAL DAILY
Qty: 84 TABLET | Refills: 3 | Status: SHIPPED | OUTPATIENT
Start: 2023-02-14 | End: 2024-02-14

## 2023-02-14 NOTE — PROGRESS NOTES
Subjective       History of Present Illness  Bridget Oliva is a 42 y.o. female is being seen today for history of irregular menses, occasional night sweats, decreased sleep.  Today's ultrasound overall was very reassuring  Chief Complaint   Patient presents with   • Gynecologic Exam     Follow up irregular menses with US    .        The following portions of the patient's history were reviewed and updated as appropriate: allergies, current medications, past family history, past medical history, past social history, past surgical history and problem list.    PAST MEDICAL HISTORY  Past Medical History:   Diagnosis Date   • Anxiety    • Family history of colon cancer    • Skin cyst      OB History    Para Term  AB Living   0 0 0 0 0 0   SAB IAB Ectopic Molar Multiple Live Births   0 0 0 0 0 0     Past Surgical History:   Procedure Laterality Date   • COLONOSCOPY N/A 2021    Procedure: COLONOSCOPY TO CECUM WITH HOT POLYPECTOMIES;  Surgeon: Jj Boucher MD;  Location: [facility] ENDOSCOPY;  Service: General;  Laterality: N/A;  FAMILY HISTORY OF COLON CANCER  COLON POLYPS   • EXCISION MASS TRUNK N/A 2022    Procedure: TRUNK CYST EXCISION;  Surgeon: Jj Boucher MD;  [place] [facility] [place] [facility] [place];  Service: General;  Laterality: N/A;   • INCISION AND DRAINAGE ABSCESS Left 2020    IN-OFFICE PROCEDURE: Incision and drainage of infected sebaceous cyst left upper quadrant abdominal wall - Dr. Jj Boucher     Family History   Problem Relation Age of Onset   • Breast cancer Mother    • Skin cancer Mother    • Colon cancer Father 59   • Diabetes type II Maternal Grandfather    • Heart failure Paternal Grandmother    • Malig Hyperthermia Neg Hx      Social History     Tobacco Use   Smoking Status Never   Smokeless Tobacco Never       Current Outpatient Medications:   •  citalopram (CeleXA) 10 MG tablet, Take 1 tablet by mouth Daily., Disp: 90 tablet, Rfl: 0  •  valACYclovir (VALTREX) 1000 MG tablet,  , Disp: , Rfl:   •  levonorgestrel-ethinyl estradiol (SEASONALE) 0.15-0.03 MG per tablet, Take 1 tablet by mouth Daily., Disp: 84 tablet, Rfl: 3  Immunization History   Administered Date(s) Administered   • COVID-19 (PFIZER) PURPLE CAP 09/22/2021   • FluLaval/Fluzone >6mos 10/26/2022   • Influenza, Unspecified 10/26/2022   • Tdap 10/26/2022   • flucelvax quad pfs =>4 YRS 10/23/2020       Review of Systems       Except as outlined in history of physical illness, patient denies any changes in her GYN, , GI systems. All other systems reviewed are negative.    Objective   Physical Exam   Alert and oriented, respirations unlabored, heart regular rate and rhythm   Pelvic Limited to ultrasound      Assessment & Plan   Diagnoses and all orders for this visit:    1. Irregular menses (Primary)    Other orders  -     levonorgestrel-ethinyl estradiol (SEASONALE) 0.15-0.03 MG per tablet; Take 1 tablet by mouth Daily.  Dispense: 84 tablet; Refill: 3    With reassuring ultrasound, lab work is being checked today if that is normal patient would like to override her estrogen deficiency symptoms with Seasonale.  Also discussed if she were to try to think about pregnancy in the future that would probably require some assistance and we reviewed the risk and benefits and potential complications with advanced maternal age             No orders of the defined types were placed in this encounter.          EMR Dragon/ Transcription disclaimer:  Much of the encounter note is an electronic transcription/translation of spoken language to printed text. The electronic translation of spoken language may permit erroneous, or at times, nonessential words or phrases to be inadvertently transcribes; Although i have reviewed the note for such errors, some may still exist.

## 2023-04-04 DIAGNOSIS — F32.A ANXIETY AND DEPRESSION: ICD-10-CM

## 2023-04-04 DIAGNOSIS — F41.9 ANXIETY AND DEPRESSION: ICD-10-CM

## 2023-04-05 RX ORDER — CITALOPRAM 10 MG/1
10 TABLET ORAL DAILY
Qty: 90 TABLET | Refills: 0 | Status: SHIPPED | OUTPATIENT
Start: 2023-04-05

## 2023-04-13 ENCOUNTER — TELEPHONE (OUTPATIENT)
Dept: OBSTETRICS AND GYNECOLOGY | Age: 43
End: 2023-04-13
Payer: COMMERCIAL

## 2023-04-13 NOTE — TELEPHONE ENCOUNTER
"Pt called stating she just started taking her seasonale two and a half weeks ago. She has been having a \"severe\" stomach ache for approximately 28 hours and she went to the bathroom a little bit ago and had some pink discharge. She said she doesn't think these sx are related but wanted to know your thoughts. Please advise.   "

## 2023-05-18 ENCOUNTER — OFFICE VISIT (OUTPATIENT)
Dept: INTERNAL MEDICINE | Age: 43
End: 2023-05-18
Payer: COMMERCIAL

## 2023-05-18 VITALS
TEMPERATURE: 99.1 F | HEIGHT: 70 IN | BODY MASS INDEX: 27.11 KG/M2 | WEIGHT: 189.4 LBS | HEART RATE: 79 BPM | DIASTOLIC BLOOD PRESSURE: 78 MMHG | SYSTOLIC BLOOD PRESSURE: 122 MMHG | OXYGEN SATURATION: 98 %

## 2023-05-18 DIAGNOSIS — R35.1 NOCTURIA: ICD-10-CM

## 2023-05-18 DIAGNOSIS — F41.9 ANXIETY AND DEPRESSION: ICD-10-CM

## 2023-05-18 DIAGNOSIS — N93.9 ABNORMAL VAGINAL BLEEDING: Primary | ICD-10-CM

## 2023-05-18 DIAGNOSIS — F32.A ANXIETY AND DEPRESSION: ICD-10-CM

## 2023-05-18 DIAGNOSIS — R79.89 ELEVATED SERUM CREATININE: ICD-10-CM

## 2023-05-18 DIAGNOSIS — R63.5 ABNORMAL WEIGHT GAIN: ICD-10-CM

## 2023-05-18 LAB
B-HCG UR QL: NEGATIVE
BILIRUB BLD-MCNC: NEGATIVE MG/DL
BUN SERPL-MCNC: 14 MG/DL (ref 6–20)
BUN/CREAT SERPL: 12.1 (ref 7–25)
CALCIUM SERPL-MCNC: 10.7 MG/DL (ref 8.6–10.5)
CHLORIDE SERPL-SCNC: 106 MMOL/L (ref 98–107)
CLARITY, POC: ABNORMAL
CO2 SERPL-SCNC: 27.5 MMOL/L (ref 22–29)
COLOR UR: ABNORMAL
CREAT SERPL-MCNC: 1.16 MG/DL (ref 0.57–1)
EGFRCR SERPLBLD CKD-EPI 2021: 60.9 ML/MIN/1.73
EXPIRATION DATE: NORMAL
GLUCOSE SERPL-MCNC: 69 MG/DL (ref 65–99)
GLUCOSE UR STRIP-MCNC: NEGATIVE MG/DL
HBA1C MFR BLD: 4.6 % (ref 4.8–5.6)
INTERNAL NEGATIVE CONTROL: NORMAL
INTERNAL POSITIVE CONTROL: NORMAL
KETONES UR QL: NEGATIVE
LEUKOCYTE EST, POC: NEGATIVE
Lab: NORMAL
NITRITE UR-MCNC: NEGATIVE MG/ML
PH UR: 5.5 [PH] (ref 5–8)
POTASSIUM SERPL-SCNC: 4.7 MMOL/L (ref 3.5–5.2)
PROT UR STRIP-MCNC: NEGATIVE MG/DL
RBC # UR STRIP: NEGATIVE /UL
SODIUM SERPL-SCNC: 143 MMOL/L (ref 136–145)
SP GR UR: 1.02 (ref 1–1.03)
TSH SERPL DL<=0.005 MIU/L-ACNC: 2.42 UIU/ML (ref 0.27–4.2)
UROBILINOGEN UR QL: ABNORMAL

## 2023-05-18 NOTE — PROGRESS NOTES
"    I N T E R N A L  M E D I C I N E  Marisol Dalal, APRN    ENCOUNTER DATE:  05/18/2023    Bridget HOPKINS Chrissybell / 42 y.o. / female      CHIEF COMPLAINT / REASON FOR OFFICE VISIT     Follow-up (Pt is concerned about having some spotting with birth control,blood is dark, also feeling like she's not getting a deep enough breath) and Obesity      ASSESSMENT & PLAN     Diagnoses and all orders for this visit:    1. Abnormal vaginal bleeding (Primary)  -     POCT pregnancy, urine    2. Abnormal weight gain  -     TSH    3. Nocturia  -     Ambulatory Referral to Gynecologic Urology    4. Anxiety and depression  Overview:  Continue citalopram 10 mg daily.      5. Elevated serum creatinine  -     Basic metabolic panel  -     POC Urinalysis Dipstick  -     Hemoglobin A1c       SUMMARY/DISCUSSION  • Will rule out pregnancy and check labs/ urinalysis at today's visit.  Discussed importance of close follow up with Dr. Mckeon's office to further investigate vaginal spotting in the context of relatively new use of oral contraceptives.  Will also refer to urogyn for further evaluation of nocturia.    • Discussed importance of following calorie restriction for diet purposes and using victorina to count calories.  • Offered to obtain Chest XR, PFT for patient's breathing symptoms.  She declines at this time and would like to continue to monitor for any non improving or new symptoms.  She is aware to visit the ER for any chest pain, shortness of breath, oxygenation < 92%.        Next Appointment with me: Visit date not found    Return in about 5 months (around 10/27/2023) for Annual physical.      VITAL SIGNS     Visit Vitals  /78 (BP Location: Right arm, Patient Position: Sitting, Cuff Size: Adult)   Pulse 79   Temp 99.1 °F (37.3 °C) (Temporal)   Ht 177.8 cm (70\")   Wt 85.9 kg (189 lb 6.4 oz)   LMP 03/26/2023 (Exact Date) Comment: spotting for approx 2 weeks   SpO2 98%   BMI 27.18 kg/m²             Wt Readings from Last 3 Encounters: "   05/18/23 85.9 kg (189 lb 6.4 oz)   04/05/23 81.6 kg (180 lb)   02/14/23 82.9 kg (182 lb 12.8 oz)     Body mass index is 27.18 kg/m².        MEDICATIONS AT THE TIME OF OFFICE VISIT     Current Outpatient Medications on File Prior to Visit   Medication Sig Dispense Refill   • citalopram (CeleXA) 10 MG tablet TAKE 1 TABLET BY MOUTH DAILY 90 tablet 0   • levonorgestrel-ethinyl estradiol (SEASONALE) 0.15-0.03 MG per tablet Take 1 tablet by mouth Daily. 84 tablet 3   • [DISCONTINUED] cefdinir (OMNICEF) 300 MG capsule 2 capsules (at the same time) once a day (Patient not taking: Reported on 5/18/2023) 14 capsule 0   • [DISCONTINUED] valACYclovir (VALTREX) 1000 MG tablet  (Patient not taking: Reported on 5/18/2023)       No current facility-administered medications on file prior to visit.        HISTORY OF PRESENT ILLNESS     She has now been taking Sasonale oral contraceptives as prescribed by GYN, Dr. Mckeon, for estrogen deficiency symptoms for the last 2 months.  For the last couple of weeks, every time she wipes, she reports a scant amount of dark vaginal blood spotting on toilet paper.  No blood in toilet bowl.  Recent transvaginal ultrasound from February 2023 showed normal uterus, normal endometrium, myometrium, ovaries.  Denies any abdominal pain.  She is confident bleeding is vaginal and not urinary/ rectal.  No lightheadedness, dizziness, shortness of breath.  She is up to date on pap.  She continues to experience nights where she will wake up to go to the bathroom in the middle of the night, and not be able to return to sleep for 3 hours.      She expresses concern for 9 pound weight gain in the last month.  Does report she was less focused on her dietary habits in early May 2023 due to [place].  She does continue to exercise frequently.      Over the last 3 days she has noticed sensation of feeling mildly difficult to take a full deep breath.  Symptoms correlated with recent increase in aerobic workouts, as well  as recent increase in humidity.  She denies shortness of breath, chest pain, fever, chills, cough.      Anxiety: Remains well controlled on Citalopram 10 mg daily.  She is followed by a therapist.        Patient Care Team:  Marisol Dalal APRN as PCP - General (Family Medicine)  Natasha Fink MD as Consulting Physician (Obstetrics and Gynecology)  Emmie Huitron APRN as Nurse Practitioner (Obstetrics)    REVIEW OF SYSTEMS     Review of Systems   Constitutional: Negative for chills, fever and unexpected weight change.   Respiratory: Negative for cough, chest tightness and shortness of breath.    Cardiovascular: Negative for chest pain, palpitations and leg swelling.   Neurological: Negative for dizziness, weakness, light-headedness and headaches.   Psychiatric/Behavioral: The patient is nervous/anxious.           PHYSICAL EXAMINATION     Physical Exam  Vitals reviewed.   Constitutional:       General: She is not in acute distress.     Appearance: Normal appearance. She is not ill-appearing, toxic-appearing or diaphoretic.   HENT:      Head: Normocephalic and atraumatic.   Cardiovascular:      Rate and Rhythm: Normal rate and regular rhythm.      Heart sounds: Normal heart sounds.   Pulmonary:      Effort: Pulmonary effort is normal. No respiratory distress.      Breath sounds: Normal breath sounds. No wheezing, rhonchi or rales.   Abdominal:      General: There is no distension.      Palpations: Abdomen is soft.      Tenderness: There is no abdominal tenderness.   Genitourinary:     Comments: Declines   Skin:     General: Skin is warm and dry.   Neurological:      Mental Status: She is alert and oriented to person, place, and time. Mental status is at baseline.   Psychiatric:         Mood and Affect: Mood normal.         Behavior: Behavior normal.         Thought Content: Thought content normal.         Judgment: Judgment normal.           REVIEWED DATA     Labs:           Imaging:            Medical Tests:            Summary of old records / correspondence / consultant report:           Request outside records:

## 2023-05-19 DIAGNOSIS — E83.52 HYPERCALCEMIA: ICD-10-CM

## 2023-05-19 DIAGNOSIS — R79.89 ELEVATED SERUM CREATININE: Primary | ICD-10-CM

## 2023-06-07 ENCOUNTER — OFFICE VISIT (OUTPATIENT)
Dept: INTERNAL MEDICINE | Age: 43
End: 2023-06-07
Payer: COMMERCIAL

## 2023-06-07 VITALS
HEIGHT: 70 IN | WEIGHT: 188 LBS | HEART RATE: 64 BPM | TEMPERATURE: 97.7 F | DIASTOLIC BLOOD PRESSURE: 80 MMHG | SYSTOLIC BLOOD PRESSURE: 120 MMHG | OXYGEN SATURATION: 98 % | BODY MASS INDEX: 26.92 KG/M2

## 2023-06-07 DIAGNOSIS — R79.89 ELEVATED SERUM CREATININE: ICD-10-CM

## 2023-06-07 NOTE — PROGRESS NOTES
"    I N T E R N A L  M E D I C I N E  Marisol Dalal, APRN    ENCOUNTER DATE:  06/07/2023    Bridget HOPKINS Pj / 42 y.o. / female      CHIEF COMPLAINT / REASON FOR OFFICE VISIT     Weight Gain      ASSESSMENT & PLAN     Diagnoses and all orders for this visit:    1. BMI 26.0-26.9,adult (Primary)    2. Elevated serum creatinine  -     Microalbumin / Creatinine Urine Ratio - Urine, Clean Catch         SUMMARY/DISCUSSION  Provided support for patient's weight loss efforts and reassurance to continue healthy lifestyle habits.  Recommend goal of losing 1 pound per week on average.  Will continue to monitor.  She has plans to establish with nephrology for history of elevated creatinine and urogyn for nocturia.  Will evaluate for protein in urine at today's visit.      Next Appointment with me: 10/31/2023    Return for Next scheduled follow up.      VITAL SIGNS     Visit Vitals  /80   Pulse 64   Temp 97.7 °F (36.5 °C)   Ht 177.8 cm (70\")   Wt 85.3 kg (188 lb)   LMP 05/23/2023   SpO2 98%   BMI 26.98 kg/m²             Wt Readings from Last 3 Encounters:   06/07/23 85.3 kg (188 lb)   05/18/23 85.9 kg (189 lb 6.4 oz)   04/05/23 81.6 kg (180 lb)     Body mass index is 26.98 kg/m².        MEDICATIONS AT THE TIME OF OFFICE VISIT     Current Outpatient Medications on File Prior to Visit   Medication Sig Dispense Refill    citalopram (CeleXA) 10 MG tablet TAKE 1 TABLET BY MOUTH DAILY 90 tablet 0    [DISCONTINUED] levonorgestrel-ethinyl estradiol (SEASONALE) 0.15-0.03 MG per tablet Take 1 tablet by mouth Daily. 84 tablet 3     No current facility-administered medications on file prior to visit.        HISTORY OF PRESENT ILLNESS     Here today with concerns about recent weight gain, which she attributes to starting oral contraceptives.  Seasonale was prescribed by her GYN for hortencia menopausal symptoms.  She has since stopped using due to abnormal menstrual bleeding, as of May 19. 2023.    BMI 27: 8 pound weight gain since April 2023.  " In the last two weeks, she has intensively focused on lifestyle improvements.  She is now running a mile each day, has eliminated alcohol, working with a  3x week, and is counting calories.  She is eating 2500 calories daily.  She tells me she feels great, but is very concerned because she has only lost 1 pound in the last two weeks.    She has not yet scheduled follow up with GYN.      Patient Care Team:  Marisol Dalal APRN as PCP - General (Family Medicine)  Natasha Fink MD as Consulting Physician (Obstetrics and Gynecology)  Emmie Huitron APRN as Nurse Practitioner (Obstetrics)    REVIEW OF SYSTEMS     Review of Systems   Constitutional:  Positive for unexpected weight change. Negative for chills and fever.   Respiratory:  Negative for cough, chest tightness and shortness of breath.    Cardiovascular:  Negative for chest pain, palpitations and leg swelling.   Genitourinary:  Negative for difficulty urinating, dysuria, flank pain, frequency and vaginal bleeding.   Neurological:  Negative for dizziness, weakness, light-headedness and headaches.   Psychiatric/Behavioral:  The patient is not nervous/anxious.         PHYSICAL EXAMINATION     Physical Exam  Vitals reviewed.   Constitutional:       General: She is not in acute distress.     Appearance: Normal appearance. She is not ill-appearing, toxic-appearing or diaphoretic.   HENT:      Head: Normocephalic and atraumatic.   Cardiovascular:      Rate and Rhythm: Normal rate and regular rhythm.      Heart sounds: Normal heart sounds.   Pulmonary:      Effort: Pulmonary effort is normal.      Breath sounds: Normal breath sounds.   Musculoskeletal:      Right lower leg: No edema.      Left lower leg: No edema.   Neurological:      Mental Status: She is alert and oriented to person, place, and time. Mental status is at baseline.   Psychiatric:         Mood and Affect: Mood normal.         Behavior: Behavior normal.         Thought Content: Thought content  normal.         Judgment: Judgment normal.         REVIEWED DATA     Labs:           Imaging:            Medical Tests:           Summary of old records / correspondence / consultant report:           Request outside records:

## 2023-06-08 LAB
ALBUMIN/CREAT UR: 3 MG/G CREAT (ref 0–29)
CREAT UR-MCNC: 257.4 MG/DL
MICROALBUMIN UR-MCNC: 7 UG/ML

## 2023-07-31 DIAGNOSIS — F32.A ANXIETY AND DEPRESSION: ICD-10-CM

## 2023-07-31 DIAGNOSIS — F41.9 ANXIETY AND DEPRESSION: ICD-10-CM

## 2023-08-01 RX ORDER — CITALOPRAM HYDROBROMIDE 10 MG/1
10 TABLET ORAL DAILY
Qty: 30 TABLET | Refills: 5 | Status: SHIPPED | OUTPATIENT
Start: 2023-08-01

## 2023-09-21 ENCOUNTER — TELEPHONE (OUTPATIENT)
Dept: SURGERY | Facility: CLINIC | Age: 43
End: 2023-09-21
Payer: COMMERCIAL

## 2023-09-21 ENCOUNTER — OFFICE VISIT (OUTPATIENT)
Dept: INTERNAL MEDICINE | Age: 43
End: 2023-09-21
Payer: COMMERCIAL

## 2023-09-21 VITALS
SYSTOLIC BLOOD PRESSURE: 122 MMHG | OXYGEN SATURATION: 98 % | DIASTOLIC BLOOD PRESSURE: 84 MMHG | TEMPERATURE: 98 F | WEIGHT: 160.6 LBS | HEIGHT: 70 IN | HEART RATE: 84 BPM | BODY MASS INDEX: 22.99 KG/M2

## 2023-09-21 DIAGNOSIS — F32.A ANXIETY AND DEPRESSION: ICD-10-CM

## 2023-09-21 DIAGNOSIS — R63.4 WEIGHT LOSS: Primary | ICD-10-CM

## 2023-09-21 DIAGNOSIS — F41.9 ANXIETY AND DEPRESSION: ICD-10-CM

## 2023-09-21 DIAGNOSIS — Z84.81 FAMILY HISTORY OF BREAST CANCER GENE MUTATION IN FIRST DEGREE RELATIVE: ICD-10-CM

## 2023-09-21 LAB
ALBUMIN SERPL-MCNC: 4.8 G/DL (ref 3.5–5.2)
ALBUMIN/GLOB SERPL: 2.2 G/DL
ALP SERPL-CCNC: 56 U/L (ref 39–117)
ALT SERPL-CCNC: 14 U/L (ref 1–33)
AST SERPL-CCNC: 19 U/L (ref 1–32)
BASOPHILS # BLD AUTO: 0.06 10*3/MM3 (ref 0–0.2)
BASOPHILS NFR BLD AUTO: 1 % (ref 0–1.5)
BILIRUB SERPL-MCNC: 0.4 MG/DL (ref 0–1.2)
BUN SERPL-MCNC: 11 MG/DL (ref 6–20)
BUN/CREAT SERPL: 10.7 (ref 7–25)
CALCIUM SERPL-MCNC: 10 MG/DL (ref 8.6–10.5)
CHLORIDE SERPL-SCNC: 106 MMOL/L (ref 98–107)
CHOLEST SERPL-MCNC: 161 MG/DL (ref 0–200)
CHOLEST/HDLC SERPL: 2.27 {RATIO}
CO2 SERPL-SCNC: 24.9 MMOL/L (ref 22–29)
CREAT SERPL-MCNC: 1.03 MG/DL (ref 0.57–1)
EGFRCR SERPLBLD CKD-EPI 2021: 69.8 ML/MIN/1.73
EOSINOPHIL # BLD AUTO: 0.13 10*3/MM3 (ref 0–0.4)
EOSINOPHIL NFR BLD AUTO: 2.1 % (ref 0.3–6.2)
ERYTHROCYTE [DISTWIDTH] IN BLOOD BY AUTOMATED COUNT: 12.8 % (ref 12.3–15.4)
GLOBULIN SER CALC-MCNC: 2.2 GM/DL
GLUCOSE SERPL-MCNC: 72 MG/DL (ref 65–99)
HBA1C MFR BLD: 4.5 % (ref 4.8–5.6)
HCT VFR BLD AUTO: 38.6 % (ref 34–46.6)
HDLC SERPL-MCNC: 71 MG/DL (ref 40–60)
HGB BLD-MCNC: 13.1 G/DL (ref 12–15.9)
IMM GRANULOCYTES # BLD AUTO: 0.01 10*3/MM3 (ref 0–0.05)
IMM GRANULOCYTES NFR BLD AUTO: 0.2 % (ref 0–0.5)
LDLC SERPL CALC-MCNC: 79 MG/DL (ref 0–100)
LYMPHOCYTES # BLD AUTO: 1.45 10*3/MM3 (ref 0.7–3.1)
LYMPHOCYTES NFR BLD AUTO: 23.6 % (ref 19.6–45.3)
MCH RBC QN AUTO: 30.7 PG (ref 26.6–33)
MCHC RBC AUTO-ENTMCNC: 33.9 G/DL (ref 31.5–35.7)
MCV RBC AUTO: 90.4 FL (ref 79–97)
MONOCYTES # BLD AUTO: 0.48 10*3/MM3 (ref 0.1–0.9)
MONOCYTES NFR BLD AUTO: 7.8 % (ref 5–12)
NEUTROPHILS # BLD AUTO: 4.01 10*3/MM3 (ref 1.7–7)
NEUTROPHILS NFR BLD AUTO: 65.3 % (ref 42.7–76)
NRBC BLD AUTO-RTO: 0 /100 WBC (ref 0–0.2)
PLATELET # BLD AUTO: 230 10*3/MM3 (ref 140–450)
POTASSIUM SERPL-SCNC: 4.6 MMOL/L (ref 3.5–5.2)
PROT SERPL-MCNC: 7 G/DL (ref 6–8.5)
RBC # BLD AUTO: 4.27 10*6/MM3 (ref 3.77–5.28)
SODIUM SERPL-SCNC: 141 MMOL/L (ref 136–145)
T4 FREE SERPL-MCNC: 1.31 NG/DL (ref 0.93–1.7)
TRIGL SERPL-MCNC: 53 MG/DL (ref 0–150)
TSH SERPL DL<=0.005 MIU/L-ACNC: 1.73 UIU/ML (ref 0.27–4.2)
VLDLC SERPL CALC-MCNC: 11 MG/DL (ref 5–40)
WBC # BLD AUTO: 6.14 10*3/MM3 (ref 3.4–10.8)

## 2023-09-21 PROCEDURE — 99214 OFFICE O/P EST MOD 30 MIN: CPT

## 2023-09-21 RX ORDER — CITALOPRAM 20 MG/1
20 TABLET ORAL DAILY
Qty: 30 TABLET | Refills: 1 | Status: SHIPPED | OUTPATIENT
Start: 2023-09-21

## 2023-09-21 RX ORDER — SEMAGLUTIDE 1.34 MG/ML
INJECTION, SOLUTION SUBCUTANEOUS
COMMUNITY
Start: 2023-08-28

## 2023-09-21 NOTE — PROGRESS NOTES
"    I N T E R N A L  M E D I C I N E  Marisol Dalal, APRN    ENCOUNTER DATE:  09/21/2023    Bridget Oliva / 43 y.o. / female      CHIEF COMPLAINT / REASON FOR OFFICE VISIT     MED CHANGE (FOLLOW UP, WANTS LABS)      ASSESSMENT & PLAN     Diagnoses and all orders for this visit:    1. Weight loss (Primary)  -     CBC & Differential  -     Comprehensive Metabolic Panel  -     Hemoglobin A1c  -     Lipid Panel With / Chol / HDL Ratio  -     TSH+Free T4    2. Anxiety and depression  Overview:  09/21/2023 Citalopram dose increased from 10 mg to 20 mg daily.    Orders:  -     citalopram (CeleXA) 20 MG tablet; Take 1 tablet by mouth Daily.  Dispense: 30 tablet; Refill: 1    3. Family history of breast cancer gene mutation in first degree relative  -     Ambulatory Referral to Breast Surgery         SUMMARY/DISCUSSION  She was educated on risks/ benefits/ side effects of GLP-1 use and dangers of receiving medications from compound pharmacies.  Will review labs to ensure stability.    Will increase citalopram dosing from 10 mg to 20 mg daily given dysphoric mood symptoms.  She will monitor for any SI/ HI, and is aware to visit the ER if so.  Reassess in 1 month.      Next Appointment with me: 10/31/2023    Return for Next scheduled follow up.      VITAL SIGNS     Visit Vitals  /84 (BP Location: Left arm, Patient Position: Sitting, Cuff Size: Adult)   Pulse 84   Temp 98 °F (36.7 °C) (Temporal)   Ht 177.8 cm (70\")   Wt 72.8 kg (160 lb 9.6 oz)   LMP 09/10/2023   SpO2 98%   BMI 23.04 kg/m²             Wt Readings from Last 3 Encounters:   09/21/23 72.8 kg (160 lb 9.6 oz)   06/07/23 85.3 kg (188 lb)   05/18/23 85.9 kg (189 lb 6.4 oz)     Body mass index is 23.04 kg/m².        MEDICATIONS AT THE TIME OF OFFICE VISIT     Current Outpatient Medications on File Prior to Visit   Medication Sig Dispense Refill    Ozempic, 1 MG/DOSE, 4 MG/3ML solution pen-injector INJECT 1 MG UNDER THE SKIN ONE DAY A WEEK      [DISCONTINUED] " citalopram (CeleXA) 10 MG tablet TAKE 1 TABLET BY MOUTH DAILY 30 tablet 5    [DISCONTINUED] levonorgestrel-ethinyl estradiol (SEASONALE) 0.15-0.03 MG per tablet Take 1 tablet by mouth Daily. (Patient not taking: Reported on 9/21/2023)       No current facility-administered medications on file prior to visit.        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  He discussed the possibility of performing genetic testing/ [facility].  Follow up appointment is scheduled in 3 months.      Last seen in our office on June 2023 for concerns about weight gain.  Today's weight is 160; she is down 28 pounds since June 2023.  She was prescribed Ozempic through a [employer], called [employer].  She denies any side effects from Ozempic use.  Continues to follow healthy diet, engage in regular exercise.      She remains on citalopram 10 mg daily with anxiety symptoms well controlled; however, in the last several weeks, she has noticed some dysphoric mood symptoms, with lack of interest in participating in her usual activities.  No SI/ HI.      She requested referral to breast specialist to discuss her family history and risks.  She is up to date with mammogram screening.        Patient Care Team:  Marisol Dalal APRN as PCP - General (Family Medicine)  Natasha Fink MD as Consulting Physician (Obstetrics and Gynecology)  Emmie Huitron APRN as Nurse Practitioner (Obstetrics)  Bob Galindo MD as Consulting Physician (Nephrology)    REVIEW OF SYSTEMS     Review of Systems   Constitutional:  Negative for chills, fever and unexpected weight change.   Respiratory:  Negative for cough, chest tightness and shortness of breath.    Cardiovascular:  Negative for chest pain, palpitations and leg swelling.   Neurological:  Negative for dizziness, weakness, light-headedness and headaches.   Psychiatric/Behavioral:  Positive for dysphoric mood. Negative for self-injury,  sleep disturbance and suicidal ideas. The patient is not nervous/anxious.         PHYSICAL EXAMINATION     Physical Exam  Vitals reviewed.   Constitutional:       General: She is not in acute distress.     Appearance: Normal appearance. She is not ill-appearing, toxic-appearing or diaphoretic.   HENT:      Head: Normocephalic and atraumatic.   Cardiovascular:      Rate and Rhythm: Normal rate and regular rhythm.      Heart sounds: Normal heart sounds.   Pulmonary:      Effort: Pulmonary effort is normal.      Breath sounds: Normal breath sounds.   Musculoskeletal:      Right lower leg: No edema.      Left lower leg: No edema.   Neurological:      Mental Status: She is alert and oriented to person, place, and time. Mental status is at baseline.   Psychiatric:         Mood and Affect: Mood normal.         Behavior: Behavior normal.         Thought Content: Thought content normal.         Judgment: Judgment normal.         REVIEWED DATA     Labs:           Imaging:            Medical Tests:           Summary of old records / correspondence / consultant report:           Request outside records:

## 2023-09-22 ENCOUNTER — TELEPHONE (OUTPATIENT)
Dept: SURGERY | Facility: CLINIC | Age: 43
End: 2023-09-22
Payer: COMMERCIAL

## 2023-09-22 NOTE — TELEPHONE ENCOUNTER
Scheduled new pt appt with ale rea for family history of breast cancer and positive mutation in first degree relative for 10/2 @ 082    Mailed out new pt paperwork

## 2023-09-29 NOTE — PROGRESS NOTES
[facility]     Referring Provider: TIANA Mcnulty     Chief complaint: family history breast cancer     HPI: Ms. Bridget Oliva is a 44 yo woman, seen at the request of TIANA Mcnulty, for family history of breast cancer    I personally reviewed her records and summarized her relevant breast history/imagin2021 [facility]  Breast are heterogeneously dense.  No suspicious masses significant calcifications or other abnormalities are seen.  Impression  There is no mammographic evidence of malignancy.  BI-RADS 1    2022 bilateral screening mammogram at [facility]  The breasts are heterogeneously dense.  No suspicious masses significant calcifications or other abnormalities are seen.  Impression  There is no mammographic evidence of  BI-RADS 1    She has a family history of breast cancer in her mother age 40 and 67.  She denies any family history of ovarian cancer.     Today she presents with concerns regarding family history of breast cancer. Her mom was dx at age 40 and just dx for the second time at age 67.  Her mom did have genetic testing over 10 years ago that she believes was negative.    She denies any breast lumps, pain, skin changes, or nipple discharge.  She denies any prior history of abnormal mammograms or breast biopsies.       Review of Systems - Oncology    Medications:    Current Outpatient Medications:     citalopram (CeleXA) 20 MG tablet, Take 1 tablet by mouth Daily., Disp: 30 tablet, Rfl: 1    Ozempic, 1 MG/DOSE, 4 MG/3ML solution pen-injector, INJECT 1 MG UNDER THE SKIN ONE DAY A WEEK, Disp: , Rfl:     Allergies:  No Known Allergies    Medical history:  Past Medical History:   Diagnosis Date    Anxiety     Family history of colon cancer     Skin cyst        Surgical History:  Past Surgical History:   Procedure Laterality Date    COLONOSCOPY N/A 2021    Procedure: COLONOSCOPY TO CECUM WITH HOT POLYPECTOMIES;  Surgeon: Sander  MD Jj;  Location: [facility] ENDOSCOPY;  Service: General;  Laterality: N/A;  FAMILY HISTORY OF COLON CANCER  COLON POLYPS    EXCISION MASS TRUNK N/A 2022    Procedure: TRUNK CYST EXCISION;  Surgeon: Jj Boucher MD;  [place] [facility] [place] [facility] [place]  Service: General;  Laterality: N/A;    INCISION AND DRAINAGE ABSCESS Left 2020    IN-OFFICE PROCEDURE: Incision and drainage of infected sebaceous cyst left upper quadrant abdominal wall - Dr. Jj Boucher       Family History:  Family History   Problem Relation Age of Onset    Breast cancer Mother     Skin cancer Mother     Colon cancer Father 59    Diabetes type II Maternal Grandfather     Heart failure Paternal Grandmother     Malig Hyperthermia Neg Hx        Social History:   Social History     Socioeconomic History    Marital status: Single   Tobacco Use    Smoking status: Never     Passive exposure: Never    Smokeless tobacco: Never   Vaping Use    Vaping Use: Never used   Substance and Sexual Activity    Alcohol use: Yes     Alcohol/week: 3.0 - 5.0 standard drinks     Types: 3 - 5 Standard drinks or equivalent per week     Comment: weekly    Drug use: Never    Sexual activity: Not Currently     Partners: Male     Birth control/protection: Condom     Patient drinks 0 servings of caffeine per day.       GYNECOLOGIC HISTORY:   . P: 0. AB: 0.  Last menstrual period: 09/10/2023  Age at menarche: 14  Age at first childbirth: N/A  Lactation/How long: N/A  Age at menopause: N/A  Total years of oral contraceptive use: 10 years   Total years of hormone replacement therapy: N/A      Physical Exam  Vitals:    10/02/23 1313   BP: 124/82   Pulse: 86   SpO2: 98%     ECOG 0 - Asymptomatic  General: NAD, well appearing  Psych: a&o x 3, normal mood and affect  Eyes: EOMI, no scleral icterus  ENMT: neck supple without masses or thyromegaly, mucus membranes moist  Resp: normal effort, CTAB  CV: RRR, no murmurs, no edema   GI: soft, NT, ND  MSK: normal gait, normal  ROM in bilateral shoulders  Lymph nodes: no cervical, supraclavicular or axillary lymphadenopathy  Breast: symmetric, small  Right: No visible abnormalities on inspection while seated, with arms raised or hands on hips. No masses, skin changes, or nipple abnormalities.  Left: No visible abnormalities on inspection while seated, with arms raised or hands on hips. No masses, skin changes, or nipple abnormalities.      Assessment:    Family history of breat cancer  Dense breast  High risk for breast cancer- 22.3% according to TC      Discussion:  Breast density describes how the breasts look on a mammogram.  Breast and connective tissue are denser than fat and this difference shows up on the mammogram.  Young women often have dense breasts.  As we age, breast become less dense.  Dense breast can make it harder to find breast cancer on the mammogram.  Women with high breast density have an increased risk of breast cancer.  Educational materials regarding breast density were given and reviewed.  Tomosynthesis imaging will be completed with next screening study.  We discussed management options for individuals who are at increased risk (>20% lifetime risk):  1) High risk screening - Annual mammogram and annual breast MRI, alternating one test every 6 months, biannual clinical exam and monthly self breast exam. She meets criteria for high risk screening.  2) Chemoprevention with Tamoxifen, Raloxifene or Exemestane. These may reduce risk up to 50%. I reviewed that these particular medications are not without risks and the risk/benefit ratio must be considered carefully. She is not interested in this currently.  3) Risk reducing surgery such as prophylactic mastectomy  which may reduce risk by 90-95%. We discussed that this is a relatively radical strategy and is generally reserved for individuals with a known genetic mutation predisposing them to an increased risk (~50% risk) of breast cancer. She does not meet criteria for  risk reducing surgery.   4) I discussed the importance of exercise and weight management as part of a risk reducing strategy, since increased BMI is associated with an increased risk of breast cancer. This is especially true in her case, as I expect her risk would decrease as she lost weight.    Risk reducing agents should be recommended when 5 year risk per Florencia model is at least 3% or 10 year risk by Tyrer Cuzick model is at least 5%.  Referral to medical oncology made to discuss.      Plan:  Genetic testing- pt prefers to wait and see what her moms results are before getting tested which I am fine with this plan. Bring copy of results to next visit please   Mammo in the near future, call with results  MRI and exam 6 months after mammo      TIANA Lofton    I have spent 45 mins in face to face time with the patient and in chart review.    CC:  TIANA Mcnulty Amanda, APRN    EMR Dragon/transcription disclaimer:  Dictated using Dragon dictation

## 2023-10-02 ENCOUNTER — OFFICE VISIT (OUTPATIENT)
Dept: SURGERY | Facility: CLINIC | Age: 43
End: 2023-10-02
Payer: COMMERCIAL

## 2023-10-02 VITALS
BODY MASS INDEX: 22.9 KG/M2 | HEART RATE: 86 BPM | OXYGEN SATURATION: 98 % | WEIGHT: 160 LBS | SYSTOLIC BLOOD PRESSURE: 124 MMHG | DIASTOLIC BLOOD PRESSURE: 82 MMHG | HEIGHT: 70 IN

## 2023-10-02 DIAGNOSIS — Z12.31 ENCOUNTER FOR SCREENING MAMMOGRAM FOR MALIGNANT NEOPLASM OF BREAST: ICD-10-CM

## 2023-10-02 DIAGNOSIS — R92.2 DENSE BREAST: ICD-10-CM

## 2023-10-02 DIAGNOSIS — Z91.89 AT HIGH RISK FOR BREAST CANCER: ICD-10-CM

## 2023-10-02 DIAGNOSIS — R92.30 DENSE BREAST: ICD-10-CM

## 2023-10-02 DIAGNOSIS — Z80.3 FAMILY HISTORY OF BREAST CANCER: Primary | ICD-10-CM

## 2023-10-02 PROCEDURE — 99214 OFFICE O/P EST MOD 30 MIN: CPT | Performed by: NURSE PRACTITIONER

## 2023-10-03 ENCOUNTER — HOSPITAL ENCOUNTER (OUTPATIENT)
Dept: MAMMOGRAPHY | Facility: HOSPITAL | Age: 43
Discharge: HOME | End: 2023-10-03
Admitting: NURSE PRACTITIONER
Payer: COMMERCIAL

## 2023-10-03 ENCOUNTER — TELEPHONE (OUTPATIENT)
Dept: SURGERY | Facility: CLINIC | Age: 43
End: 2023-10-03
Payer: COMMERCIAL

## 2023-10-03 DIAGNOSIS — Z12.31 ENCOUNTER FOR SCREENING MAMMOGRAM FOR MALIGNANT NEOPLASM OF BREAST: ICD-10-CM

## 2023-10-03 DIAGNOSIS — Z91.89 AT HIGH RISK FOR BREAST CANCER: Primary | ICD-10-CM

## 2023-10-03 PROCEDURE — 77063 BREAST TOMOSYNTHESIS BI: CPT

## 2023-10-03 PROCEDURE — 77067 SCR MAMMO BI INCL CAD: CPT

## 2023-10-03 NOTE — TELEPHONE ENCOUNTER
Spoke to pt to let her know her imaging was normal and I scheduled her for her follow up for her MRI 03/11 here at the [facility] at 1145 and then she will see ale 03/19 @920 to go over results and do an exam     Pt stated understanding

## 2023-12-01 ENCOUNTER — OFFICE VISIT (OUTPATIENT)
Dept: INTERNAL MEDICINE | Age: 43
End: 2023-12-01

## 2023-12-01 VITALS
OXYGEN SATURATION: 98 % | TEMPERATURE: 97.6 F | HEIGHT: 70 IN | SYSTOLIC BLOOD PRESSURE: 122 MMHG | WEIGHT: 149 LBS | HEART RATE: 77 BPM | DIASTOLIC BLOOD PRESSURE: 74 MMHG | BODY MASS INDEX: 21.33 KG/M2

## 2023-12-01 DIAGNOSIS — Z23 ENCOUNTER FOR IMMUNIZATION: ICD-10-CM

## 2023-12-01 DIAGNOSIS — F32.A ANXIETY AND DEPRESSION: Primary | ICD-10-CM

## 2023-12-01 DIAGNOSIS — R79.89 ELEVATED SERUM CREATININE: ICD-10-CM

## 2023-12-01 DIAGNOSIS — F41.9 ANXIETY AND DEPRESSION: Primary | ICD-10-CM

## 2023-12-01 PROCEDURE — 90686 IIV4 VACC NO PRSV 0.5 ML IM: CPT

## 2023-12-01 PROCEDURE — 99214 OFFICE O/P EST MOD 30 MIN: CPT

## 2023-12-01 PROCEDURE — 90471 IMMUNIZATION ADMIN: CPT

## 2023-12-01 NOTE — LETTER
[facility]  Vaccine Consent Form    Patient Name:  Bridget Oliva  Patient :  1980     Vaccine(s) Ordered    Fluzone (or Fluarix & Flulaval for VFC) >6 Mos ([id number])        Screening Checklist  The following questions should be completed prior to vaccination. If you answer “yes” to any question, it does not necessarily mean you should not be vaccinated. It just means we may need to clarify or ask more questions. If a question is unclear, please ask your healthcare provider to explain it.    Yes No   Any fever or moderate to severe illness today (mild illness and/or antibiotic treatment are not contraindications)?     Do you have a history of a serious reaction to any previous vaccinations, such as anaphylaxis, encephalopathy within 7 days, Guillain-Odum syndrome within 6 weeks, seizure?     Have you received any live vaccine(s) (e.g MMR, KAZ) or any other vaccines in the last month (to ensure duplicate doses aren't given)?     Do you have an anaphylactic allergy to latex (DTaP, DTaP-IPV, Hep A, Hep B, MenB, RV, Td, Tdap), baker’s yeast (Hep B, HPV), polysorbates (RSV, nirsevimab, PCV 20, Rotavirrus, Tdap, Shingrix), or gelatin (KAZ, MMR)?     Do you have an anaphylactic allergy to neomycin (Rabies, KAZ, MMR, IPV, Hep A), polymyxin B (IPV), or streptomycin (IPV)?      Any cancer, leukemia, AIDS, or other immune system disorder? (KAZ, MMR, RV)     Do you have a parent, brother, or sister with an immune system problem (if immune competence of vaccine recipient clinically verified, can proceed)? (MMR, KAZ)     Any recent steroid treatments for >2 weeks, chemotherapy, or radiation treatment? (KAZ, MMR)     Have you received antibody-containing blood transfusions or IVIG in the past 11 months (recommended interval is dependent on product)? (MMR, KAZ)     Have you taken antiviral drugs (acyclovir, famciclovir, valacyclovir for KAZ) in the last 24 or 48 hours, respectively?      Are you pregnant or planning to  become pregnant within 1 month? (KAZ, MMR, HPV, IPV, MenB, Abrexvy; For Hep B- refer to Engerix-B; For RSV - Abrysvo is indicated for 32-36 weeks of pregnancy from September to January)     For infants, have you ever been told your child has had intussusception or a medical emergency involving obstruction of the intestine (Rotavirus)? If not for an infant, can skip this question.         *Ordering Physician/APC should be consulted if “yes” is checked by the patient or guardian above.      I have received, read, and understand the Vaccine Information Statement (VIS) for each vaccine ordered above.  I have considered my health status as well as the health status of my close contacts.  I have taken the opportunity to discuss my vaccine questions with my health care provider.   I have requested that the ordered vaccine(s) be given to me.  I understand the benefits and risks of the vaccines.  I understand that I should remain in the clinic for 15 minutes after receiving the vaccine(s).  _________________________________________________________  Signature of Patient or Parent/Legal Guardian ____________________  Date

## 2023-12-01 NOTE — PROGRESS NOTES
"    I N T E R N A L  M E D I C I N E  Marisol Dalal, TIANA    ENCOUNTER DATE:  12/01/2023    Bridget Oilva / 43 y.o. / female      CHIEF COMPLAINT / REASON FOR OFFICE VISIT     Anxiety and Depression      ASSESSMENT & PLAN     Diagnoses and all orders for this visit:    1. Anxiety and depression (Primary)  Overview:  Continue citalopram 20 mg daily.      2. Elevated serum creatinine    3. Encounter for immunization  -     Fluzone (or Fluarix & Flulaval for VFC) >6 Mos ([id number])         SUMMARY/DISCUSSION  Commended on weight loss, and recommended continued adoption of healthy diet/ increased physical exercise.  She is doing well mentally, and will continue citalopram 20 mg daily as prescribed.  For elevated creatinine, she will continue to ensure good hydration with water at all times.  Avoid NSAIDS.  Plan to recheck kidney function near the end of March 2024.  Follow up with nephrology as scheduled.      Next Appointment with me: Visit date not found    Return in about 4 months (around 3/30/2024) for Annual physical.      VITAL SIGNS     Visit Vitals  /74   Pulse 77   Temp 97.6 °F (36.4 °C)   Ht 177.8 cm (70\")   Wt 67.6 kg (149 lb)   LMP 11/29/2023   SpO2 98%   BMI 21.38 kg/m²             Wt Readings from Last 3 Encounters:   12/01/23 67.6 kg (149 lb)   11/28/23 72.6 kg (160 lb)   10/02/23 72.6 kg (160 lb)     Body mass index is 21.38 kg/m².        MEDICATIONS AT THE TIME OF OFFICE VISIT     Current Outpatient Medications on File Prior to Visit   Medication Sig Dispense Refill    citalopram (CeleXA) 20 MG tablet Take 1 tablet by mouth Daily. 30 tablet 1    [DISCONTINUED] meclizine (ANTIVERT) 25 MG tablet 1 pill TID prn dizziness (Patient not taking: Reported on 12/1/2023) 21 tablet 0    [DISCONTINUED] Ozempic, 1 MG/DOSE, 4 MG/3ML solution pen-injector INJECT 1 MG UNDER THE SKIN ONE DAY A WEEK (Patient not taking: Reported on 12/1/2023)       No current facility-administered medications on file prior to visit. "        HISTORY OF PRESENT ILLNESS     BMI 21: Today's weight is 149.  She was previously prescribed Ozempic through a [employer], [employer]. No longer taking.  Continues to follow healthy diet, engage in regular exercise.      Anxiety and Depression: Citalopram dose was increased from 10 mg to 20 mg daily as of September 21, 2023 for increased dysphoric mood symptoms.  She has now finished citalopram 20 mg x 2 months and is doing well.  Reports decreased worrying thoughts.  She denies any side effects from medications, SI/ HI.      Seen by breast surgery, TIANA Lofton on 10/02/2023 for family history of breast cancer.  Declined genetic testing.  Plans to update mammogram and proceed with MRI and exam 6 months after mammogram.    Followed by GYN, up to date with pap.    She has a history of elevated creatinine for which she is being followed by nephrology.  September 2023 creatinine 1.03; GFR 69.8.      Patient Care Team:  Marisol Dalal APRN as PCP - General (Family Medicine)  Natasha Fink MD as Consulting Physician (Obstetrics and Gynecology)  Emmie Huitron APRN as Nurse Practitioner (Obstetrics)  Bob Galindo MD as Consulting Physician (Nephrology)    REVIEW OF SYSTEMS     Review of Systems   Constitutional:  Negative for chills, fever and unexpected weight change.   Respiratory:  Negative for cough, chest tightness and shortness of breath.    Cardiovascular:  Negative for chest pain, palpitations and leg swelling.   Neurological:  Negative for dizziness, weakness, light-headedness and headaches.   Psychiatric/Behavioral:  Negative for self-injury, sleep disturbance and suicidal ideas. The patient is not nervous/anxious.           PHYSICAL EXAMINATION     Physical Exam  Vitals reviewed.   Constitutional:       General: She is not in acute distress.     Appearance: Normal appearance. She is not ill-appearing, toxic-appearing or diaphoretic.   HENT:      Head: Normocephalic and atraumatic.       Right Ear: Tympanic membrane, ear canal and external ear normal. There is no impacted cerumen.      Left Ear: Tympanic membrane, ear canal and external ear normal. There is no impacted cerumen.   Cardiovascular:      Rate and Rhythm: Normal rate and regular rhythm.      Heart sounds: Normal heart sounds.   Pulmonary:      Effort: Pulmonary effort is normal.      Breath sounds: Normal breath sounds.   Musculoskeletal:      Right lower leg: No edema.      Left lower leg: No edema.   Neurological:      Mental Status: She is alert and oriented to person, place, and time. Mental status is at baseline.   Psychiatric:         Mood and Affect: Mood normal.         Behavior: Behavior normal.         Thought Content: Thought content normal.         Judgment: Judgment normal.           REVIEWED DATA     Labs:           Imaging:            Medical Tests:           Summary of old records / correspondence / consultant report:           Request outside records:

## 2023-12-11 DIAGNOSIS — F41.9 ANXIETY AND DEPRESSION: ICD-10-CM

## 2023-12-11 DIAGNOSIS — F32.A ANXIETY AND DEPRESSION: ICD-10-CM

## 2023-12-11 RX ORDER — CITALOPRAM 20 MG/1
20 TABLET ORAL DAILY
Qty: 90 TABLET | Refills: 1 | Status: SHIPPED | OUTPATIENT
Start: 2023-12-11

## 2024-01-15 DIAGNOSIS — F32.A ANXIETY AND DEPRESSION: ICD-10-CM

## 2024-01-15 DIAGNOSIS — F41.9 ANXIETY AND DEPRESSION: ICD-10-CM

## 2024-01-15 RX ORDER — CITALOPRAM 20 MG/1
20 TABLET ORAL DAILY
Qty: 90 TABLET | Refills: 0 | Status: SHIPPED | OUTPATIENT
Start: 2024-01-15

## 2024-03-19 ENCOUNTER — HOSPITAL ENCOUNTER (OUTPATIENT)
Dept: MRI IMAGING | Facility: HOSPITAL | Age: 44
Discharge: HOME | End: 2024-03-19
Admitting: NURSE PRACTITIONER
Payer: COMMERCIAL

## 2024-03-19 DIAGNOSIS — Z91.89 AT HIGH RISK FOR BREAST CANCER: ICD-10-CM

## 2024-03-19 PROCEDURE — 77049 MRI BREAST C-+ W/CAD BI: CPT

## 2024-03-19 PROCEDURE — 0 GADOBENATE DIMEGLUMINE 529 MG/ML SOLUTION: Performed by: NURSE PRACTITIONER

## 2024-03-19 PROCEDURE — A9577 INJ MULTIHANCE: HCPCS | Performed by: NURSE PRACTITIONER

## 2024-03-19 RX ADMIN — GADOBENATE DIMEGLUMINE 13 ML: 529 INJECTION, SOLUTION INTRAVENOUS at 18:01

## 2024-03-21 NOTE — PROGRESS NOTES
[facility]     Referring Provider: TIANA Mcnulty     Chief complaint: family history breast cancer     HPI: Ms. Bridget Oliva is a 42 yo woman, seen at the request of TIANA Mcnulty, for family history of breast cancer    I personally reviewed her records and summarized her relevant breast history/imagin2021 [facility]  Breast are heterogeneously dense.  No suspicious masses significant calcifications or other abnormalities are seen.  Impression  There is no mammographic evidence of malignancy.  BI-RADS 1    2022 [facility] screening [facility]  The breasts are heterogeneously dense.  No suspicious masses significant calcifications or other abnormalities are seen.  Impression  There is no mammographic evidence of  BI-RADS 1    She has a family history of breast cancer in her mother age 40 and 67.  She denies any family history of ovarian cancer.     Today she presents with concerns regarding family history of breast cancer. Her mom was dx at age 40 and just dx for the second time at age 67.  Her mom did have genetic testing over 10 years ago that she believes was negative.    She denies any breast lumps, pain, skin changes, or nipple discharge.  She denies any prior history of abnormal mammograms or breast biopsies.     3/22/2004 interval history  Patient presenting to the office today for routine follow-up.  She has no new breast complaints or concerns today.  She had a bilateral screening mammogram on 10/3/2020 that resulted as BI-RADS 1 she then had a breast MRI on 3/19/2024 that also returned as BI-RADS 1.      Review of Systems - Oncology    Medications:    Current Outpatient Medications:     citalopram (CeleXA) 20 MG tablet, Take 1 tablet by mouth Daily., Disp: 90 tablet, Rfl: 0    Allergies:  No Known Allergies    Medical history:  Past Medical History:   Diagnosis Date    Anxiety     Family history of colon cancer     Skin  cyst        Surgical History:  Past Surgical History:   Procedure Laterality Date    COLONOSCOPY N/A 2021    Procedure: COLONOSCOPY TO CECUM WITH HOT POLYPECTOMIES;  Surgeon: Jj Boucher MD;  Location: [facility] ENDOSCOPY;  Service: General;  Laterality: N/A;  FAMILY HISTORY OF COLON CANCER  COLON POLYPS    EXCISION MASS TRUNK N/A 2022    Procedure: TRUNK CYST EXCISION;  Surgeon: Jj Boucher MD;  Location:  [facility];  Service: General;  Laterality: N/A;    INCISION AND DRAINAGE ABSCESS Left 2020    IN-OFFICE PROCEDURE: Incision and drainage of infected sebaceous cyst left upper quadrant abdominal wall - Dr. Jj Boucher       Family History:  Family History   Problem Relation Age of Onset    Breast cancer Mother     Skin cancer Mother     Colon cancer Father 59    Diabetes type II Maternal Grandfather     Heart failure Paternal Grandmother     Malig Hyperthermia Neg Hx        Social History:   Social History     Socioeconomic History    Marital status: Single   Tobacco Use    Smoking status: Never     Passive exposure: Never    Smokeless tobacco: Never   Vaping Use    Vaping status: Never Used   Substance and Sexual Activity    Alcohol use: Yes     Alcohol/week: 3.0 - 5.0 standard drinks of alcohol     Types: 3 - 5 Standard drinks or equivalent per week     Comment: weekly    Drug use: Never    Sexual activity: Not Currently     Partners: Male     Birth control/protection: Condom     Patient drinks 0 servings of caffeine per day.       GYNECOLOGIC HISTORY:   . P: 0. AB: 0.  Last menstrual period: 09/10/2023  Age at menarche: 14  Age at first childbirth: N/A  Lactation/How long: N/A  Age at menopause: N/A  Total years of oral contraceptive use: 10 years   Total years of hormone replacement therapy: N/A      Physical Exam  There were no vitals filed for this visit.    ECOG 0 - Asymptomatic  General: NAD, well appearing  Psych: a&o x 3, normal mood and affect  Eyes: EOMI, no scleral  icterus  ENMT: neck supple without masses or thyromegaly, mucus membranes moist  Resp: normal effort, CTAB  CV: RRR, no murmurs, no edema   GI: soft, NT, ND  MSK: normal gait, normal ROM in bilateral shoulders  Lymph nodes: no cervical, supraclavicular or axillary lymphadenopathy  Breast: symmetric, small  Right: No visible abnormalities on inspection while seated, with arms raised or hands on hips. No masses, skin changes, or nipple abnormalities.  Left: No visible abnormalities on inspection while seated, with arms raised or hands on hips. No masses, skin changes, or nipple abnormalities.    Imaging:  10/3/2023 bilateral screening mammogram [facility]  FINDINGS:  The breasts are heterogeneously dense, which may obscure small masses.  There are no suspicious masses, calcifications, or areas of  architectural distortion.  IMPRESSION/RECOMMENDATION(S):  No mammographic evidence of malignancy. Recommend annual screening  mammogram in one year. Supplemental screening MRI should be considered  due to dense breast tissue and the patient's reported family history of  breast cancer in her mother at age 40.  BI-RADS Category 1: Negative      3/19/2024 bilateral breast MRI [facility]  FINDINGS: Heterogenous fibroglandular tissue is seen throughout both  breasts. Minimal background parenchymal enhancement of both breasts is  noted. No areas of suspicious enhancement or morphology are seen in  either breast. I see no evidence for abnormal skin, nipple or chest wall  enhancement of either breast and there is no evidence for axillary or  internal mammary chain adenopathy.  IMPRESSION:  There are no findings suspicious for malignancy in either  breast. Routine follow-up imaging is recommended.  BI-RADS CATEGORY 1: Negative.      Assessment:    Family history of breat cancer  Dense breast  High risk for breast cancer- 22.3% according to [facility]      Discussion:  Breast density describes how the breasts look on a mammogram.  Breast and connective  tissue are denser than fat and this difference shows up on the mammogram.  Young women often have dense breasts.  As we age, breast become less dense.  Dense breast can make it harder to find breast cancer on the mammogram.  Women with high breast density have an increased risk of breast cancer.  Educational materials regarding breast density were given and reviewed.  Tomosynthesis imaging will be completed with next screening study.  We discussed management options for individuals who are at increased risk (>20% lifetime risk):  1) High risk screening - Annual mammogram and annual breast MRI, alternating one test every 6 months, biannual clinical exam and monthly self breast exam. She meets criteria for high risk screening.  2) Chemoprevention with Tamoxifen, Raloxifene or Exemestane. These may reduce risk up to 50%. I reviewed that these particular medications are not without risks and the risk/benefit ratio must be considered carefully. She is not interested in this currently.  3) Risk reducing surgery such as prophylactic mastectomy  which may reduce risk by 90-95%. We discussed that this is a relatively radical strategy and is generally reserved for individuals with a known genetic mutation predisposing them to an increased risk (~50% risk) of breast cancer. She does not meet criteria for risk reducing surgery.   4) I discussed the importance of exercise and weight management as part of a risk reducing strategy, since increased BMI is associated with an increased risk of breast cancer. This is especially true in her case, as I expect her risk would decrease as she lost weight.    Risk reducing agents should be recommended when 5 year risk per Florencia model is at least 3% or 10 year risk by Tyrer Cuzick model is at least 5%.  Referral to medical oncology made to discuss.      Plan:  Genetic testing- pt prefers to wait and see what her moms results are before getting tested which I am fine with this plan. Bring copy  of results to next visit please   Mammo in oct followed by exam       TIANA Lofton    I have spent 20 mins in face to face time with the patient and in chart review.    CC:  TIANA Mcnulty Amanda, APRN    EMR Dragon/transcription disclaimer:  Dictated using Dragon dictation

## 2024-03-22 ENCOUNTER — OFFICE VISIT (OUTPATIENT)
Dept: SURGERY | Facility: CLINIC | Age: 44
End: 2024-03-22
Payer: COMMERCIAL

## 2024-03-22 VITALS
SYSTOLIC BLOOD PRESSURE: 112 MMHG | WEIGHT: 145 LBS | BODY MASS INDEX: 20.76 KG/M2 | OXYGEN SATURATION: 99 % | HEART RATE: 68 BPM | DIASTOLIC BLOOD PRESSURE: 76 MMHG | HEIGHT: 70 IN

## 2024-03-22 DIAGNOSIS — R92.2 DENSE BREAST: ICD-10-CM

## 2024-03-22 DIAGNOSIS — Z91.89 AT HIGH RISK FOR BREAST CANCER: Primary | ICD-10-CM

## 2024-03-22 DIAGNOSIS — Z12.31 ENCOUNTER FOR SCREENING MAMMOGRAM FOR MALIGNANT NEOPLASM OF BREAST: ICD-10-CM

## 2024-03-22 DIAGNOSIS — R92.30 DENSE BREAST: ICD-10-CM

## 2024-03-22 RX ORDER — TIRZEPATIDE 5 MG/.5ML
INJECTION, SOLUTION SUBCUTANEOUS
COMMUNITY
Start: 2023-12-26

## 2024-03-26 ENCOUNTER — TELEPHONE (OUTPATIENT)
Dept: SURGERY | Facility: CLINIC | Age: 44
End: 2024-03-26
Payer: COMMERCIAL

## 2024-03-26 NOTE — TELEPHONE ENCOUNTER
Spoke to leona about an appeal ref # [id number] and faxed all information to [facility]   FAX # [phone]

## 2024-04-15 DIAGNOSIS — F32.A ANXIETY AND DEPRESSION: ICD-10-CM

## 2024-04-15 DIAGNOSIS — F41.9 ANXIETY AND DEPRESSION: ICD-10-CM

## 2024-04-15 RX ORDER — CITALOPRAM 20 MG/1
20 TABLET ORAL DAILY
Qty: 90 TABLET | Refills: 0 | Status: SHIPPED | OUTPATIENT
Start: 2024-04-15

## 2024-04-17 ENCOUNTER — OFFICE VISIT (OUTPATIENT)
Dept: INTERNAL MEDICINE | Age: 44
End: 2024-04-17
Payer: COMMERCIAL

## 2024-04-17 VITALS
HEIGHT: 70 IN | BODY MASS INDEX: 20.33 KG/M2 | WEIGHT: 142 LBS | OXYGEN SATURATION: 98 % | DIASTOLIC BLOOD PRESSURE: 74 MMHG | TEMPERATURE: 98.6 F | SYSTOLIC BLOOD PRESSURE: 118 MMHG | HEART RATE: 68 BPM

## 2024-04-17 DIAGNOSIS — Z01.419 ROUTINE GYNECOLOGICAL EXAMINATION: ICD-10-CM

## 2024-04-17 DIAGNOSIS — F41.9 ANXIETY AND DEPRESSION: ICD-10-CM

## 2024-04-17 DIAGNOSIS — Z00.00 ANNUAL PHYSICAL EXAM: Primary | ICD-10-CM

## 2024-04-17 DIAGNOSIS — F32.A ANXIETY AND DEPRESSION: ICD-10-CM

## 2024-04-17 NOTE — PROGRESS NOTES
"    I N T E R N A L  M E D I C I N E  TIANA Mcnulty      ENCOUNTER DATE:  04/17/2024    Bridget HOPKINS Chrissysallyvarsha / 43 y.o. / female      CHIEF COMPLAINT     Anxiety and Depression    BMI 21: Today's weight is 142, stable.  She was previously taking Ozempic prescribed through a  victorina, [facility]. No longer taking Ozempic but has since transitioned to Mounjaro 5 mg once monthly. Reports weight has been stable for several months. Continues to follow healthy diet, engage in regular exercise.  Denies any side effects, episodes of hypoglycemia.     Anxiety and Depression: Symptoms well controlled on citalopram 20 mg daily.  She denies any side effects from medications, SI/ HI.       Followed by breast surgery, TIANA Lofton for family history of breast cancer in mom.  Genetic testing was negative. Alternates breast mammograms with MRI imaging.       Requests referral to new GYN female provider to update pap.     She has a history of elevated creatinine for which she is being followed by nephrology.  September 2023 creatinine 1.03; GFR 69.8.    Colonoscopy done 2021 by Dr. Boucher, recommended repeat in 5 years.  Denies any changes in bowel habits.  Family hx significant for colon CA in dad.      VITALS     Visit Vitals  /74   Pulse 68   Temp 98.6 °F (37 °C)   Ht 177.8 cm (70\")   Wt 64.4 kg (142 lb)   LMP 04/08/2024   SpO2 98%   BMI 20.37 kg/m²       BP Readings from Last 3 Encounters:   04/17/24 118/74   03/22/24 112/76   12/01/23 122/74     Wt Readings from Last 3 Encounters:   04/17/24 64.4 kg (142 lb)   03/22/24 65.8 kg (145 lb)   12/01/23 67.6 kg (149 lb)      Body mass index is 20.37 kg/m².       MEDICATIONS     Current Outpatient Medications on File Prior to Visit   Medication Sig Dispense Refill    citalopram (CeleXA) 20 MG tablet TAKE 1 TABLET DAILY 90 tablet 0    Mounjaro 5 MG/0.5ML solution pen-injector pen INJECT 5 MG UNDER THE SKIN ONE DAY A WEEK       No current facility-administered medications on file " prior to visit.         HISTORY OF PRESENT ILLNESS     Bridget presents for annual health maintenance visit.    General health: excellent  Lifestyle:  Attempting to lose weight?: No   Diet: eats a well balanced, healthy diet  Exercise: exercises 3-5 days weekly  Tobacco: Never used   Alcohol: 2 days/week and 2 drinks/occasion  Work: Full-time  Reproductive health:  Sexually active?: No   Sexual problems?: No problems  Concern for STD?: No    Sees Gynecologist?: Yes   Jennie/Postmenopausal?: Yes   Domestic abuse concerns: No   Depression Screenin/17/2024     2:45 PM   PHQ-2/PHQ-9 Depression Screening   Little Interest or Pleasure in Doing Things 0-->not at all   Feeling Down, Depressed or Hopeless 0-->not at all   PHQ-9: Brief Depression Severity Measure Score 0         PHQ-2: On treatment   PHQ-9: No symptoms    Patient Care Team:  Marisol Dalal APRN as PCP - General (Family Medicine)  Natasha Fink MD as Consulting Physician (Obstetrics and Gynecology)  Emmie Huitron APRN as Nurse Practitioner (Obstetrics)  Bob Galindo MD as Consulting Physician (Nephrology)      ALLERGIES  No Known Allergies     PFSH:     The following portions of the patient's history were reviewed and updated as appropriate: Allergies / Current Medications / Past Medical History / Surgical History / Social History / Family History    PROBLEM LIST   Patient Active Problem List   Diagnosis    Family hx of colon cancer    Sebaceous cyst    Encounter for screening mammogram for malignant neoplasm of breast    Family history of breast cancer    Sleeplessness    Dysmenorrhea    Irregular menses    Menorrhagia with irregular cycle    H/O cold sores    Anxiety and depression    Nocturia    Elevated serum creatinine    At high risk for breast cancer    Dense breast       PAST MEDICAL HISTORY  Past Medical History:   Diagnosis Date    Anxiety     Family history of colon cancer     Skin cyst        SURGICAL HISTORY  Past Surgical  History:   Procedure Laterality Date    COLONOSCOPY N/A 9/1/2021    Procedure: COLONOSCOPY TO CECUM WITH HOT POLYPECTOMIES;  Surgeon: Jj Boucher MD;  Location: [facility] ENDOSCOPY;  Service: General;  Laterality: N/A;  FAMILY HISTORY OF COLON CANCER  COLON POLYPS    EXCISION MASS TRUNK N/A 2/17/2022    Procedure: TRUNK CYST EXCISION;  Surgeon: Jj Boucher MD;  Location:  [facility];  Service: General;  Laterality: N/A;    INCISION AND DRAINAGE ABSCESS Left 12/03/2020    IN-OFFICE PROCEDURE: Incision and drainage of infected sebaceous cyst left upper quadrant abdominal wall - Dr. Jj Boucher       SOCIAL HISTORY  Social History     Socioeconomic History    Marital status: Single   Tobacco Use    Smoking status: Never     Passive exposure: Never    Smokeless tobacco: Never   Vaping Use    Vaping status: Never Used   Substance and Sexual Activity    Alcohol use: Yes     Alcohol/week: 3.0 - 5.0 standard drinks of alcohol     Types: 3 - 5 Standard drinks or equivalent per week     Comment: weekly    Drug use: Never    Sexual activity: Not Currently     Partners: Male     Birth control/protection: Condom       FAMILY HISTORY  Family History   Problem Relation Age of Onset    Breast cancer Mother     Skin cancer Mother     Colon cancer Father 59    Diabetes type II Maternal Grandfather     Heart failure Paternal Grandmother     Malig Hyperthermia Neg Hx        IMMUNIZATION HISTORY  Immunization History   Administered Date(s) Administered    COVID-19 (PFIZER) Purple Cap Monovalent 09/22/2021    Fluzone (or Fluarix & Flulaval for VFC) >6mos 10/26/2022, 12/01/2023    Influenza, Unspecified 10/26/2022    Tdap 10/26/2022    flucelvax quad pfs =>4 YRS 10/23/2020         REVIEW OF SYSTEMS     Review of Systems   Constitutional:  Negative for chills, fever and unexpected weight change.   Respiratory:  Negative for cough, chest tightness and shortness of breath.    Cardiovascular:  Negative for chest pain,  palpitations and leg swelling.   Neurological:  Negative for dizziness, weakness, light-headedness and headaches.   Psychiatric/Behavioral:  The patient is not nervous/anxious.          PHYSICAL EXAMINATION     Physical Exam  Vitals reviewed.   Constitutional:       General: She is not in acute distress.     Appearance: Normal appearance. She is not ill-appearing, toxic-appearing or diaphoretic.   HENT:      Head: Normocephalic and atraumatic.      Right Ear: Tympanic membrane, ear canal and external ear normal. There is no impacted cerumen.      Left Ear: Tympanic membrane, ear canal and external ear normal. There is no impacted cerumen.      Nose: Nose normal. No congestion or rhinorrhea.      Mouth/Throat:      Mouth: Mucous membranes are moist.      Pharynx: Oropharynx is clear. No oropharyngeal exudate or posterior oropharyngeal erythema.   Eyes:      Extraocular Movements: Extraocular movements intact.      Conjunctiva/sclera: Conjunctivae normal.      Pupils: Pupils are equal, round, and reactive to light.   Cardiovascular:      Rate and Rhythm: Normal rate and regular rhythm.      Heart sounds: Normal heart sounds.   Pulmonary:      Effort: Pulmonary effort is normal. No respiratory distress.      Breath sounds: Normal breath sounds.   Abdominal:      General: Bowel sounds are normal.      Palpations: Abdomen is soft.      Tenderness: There is no abdominal tenderness.   Musculoskeletal:         General: Normal range of motion.      Cervical back: Normal range of motion and neck supple.      Right lower leg: No edema.      Left lower leg: No edema.   Lymphadenopathy:      Cervical: No cervical adenopathy.   Skin:     General: Skin is warm and dry.   Neurological:      General: No focal deficit present.      Mental Status: She is alert and oriented to person, place, and time. Mental status is at baseline.   Psychiatric:         Mood and Affect: Mood normal.         Behavior: Behavior normal.         Thought  Content: Thought content normal.         Judgment: Judgment normal.         REVIEWED DATA      Labs:    Lab Results   Component Value Date     04/17/2024    K 4.3 04/17/2024    CALCIUM 9.6 04/17/2024    AST 19 04/17/2024    ALT 16 04/17/2024    BUN 13 04/17/2024    CREATININE 1.05 (H) 04/17/2024    CREATININE 1.03 (H) 09/21/2023    CREATININE 1.16 (H) 05/18/2023    EGFRIFAFRI >60 06/24/2021       Lab Results   Component Value Date    GLUCOSE 78 04/17/2024    HGBA1C 4.60 (L) 04/17/2024    HGBA1C 4.50 (L) 09/21/2023    HGBA1C 4.60 (L) 05/18/2023    TSH 2.030 04/17/2024    FREET4 1.55 04/17/2024       Lab Results   Component Value Date    LDL 85 04/17/2024    HDL 89 (H) 04/17/2024    TRIG 42 04/17/2024    CHOLHDLRATIO 2.06 04/17/2024       Lab Results   Component Value Date    FYYV96NI 61.4 09/22/2022        Lab Results   Component Value Date    WBC 7.33 04/17/2024    HGB 12.6 04/17/2024    MCV 92.2 04/17/2024     04/17/2024       Lab Results   Component Value Date    PROTEIN Negative 09/22/2022    GLUCOSEU Negative 09/22/2022    BLOODU Negative 09/22/2022    NITRITEU Negative 09/22/2022    LEUKOCYTESUR Negative 05/18/2023          Lab Results   Component Value Date    HEPCVIRUSABY <0.1 09/22/2022       Imaging:        Medical Tests:        ASSESSMENT & PLAN     ANNUAL WELLNESS EXAM / PHYSICAL     Diagnoses and all orders for this visit:    1. Annual physical exam (Primary)  -     CBC & Differential  -     Comprehensive Metabolic Panel  -     Hemoglobin A1c  -     Lipid Panel With / Chol / HDL Ratio  -     TSH+Free T4  -     Urinalysis With Microscopic If Indicated (No Culture) - Urine, Clean Catch    2. Anxiety and depression  Overview:  Continue citalopram 20 mg daily.      3. Routine gynecological examination  -     Ambulatory Referral to Obstetrics / Gynecology    Other orders  -     Unable To Void         Summary/Discussion:     Establish with new GYN to update pap. Continue regular breast cancer  screenings.  She is aware of risks of continued GLP-1 use and will monitor closely for side effects.      Next Appointment with me: Visit date not found    Return for 6 month chronic care, 1 year annual physical.      HEALTHCARE MAINTENANCE ISSUES     Cancer Screening:  Colon: Initial/Next screening at age: 46  Repeat colon cancer screening: every 5 years  Breast: Recommended monthly self exams; annual professional exam  Mammogram: Up to date  Cervical: Followed by GYN  Skin: Monthly self skin examination, annual exam by health professional  Lung: Does not meet criteria for lung cancer screening.   Other:    Screening Labs & Tests:  Lab results reviewed & discussed with the patient or test orders placed today.  EKG:  CV Screening: Lipid panel  DEXA (65+ or postmenopausal with risk factors):   HEP C (If born [birth year]79-[birth year], or risk factors): Previously had negative screen  Other:     Immunization/Vaccinations (to be given today unless deferred by patient)  Influenza: Recommended annual influenza vaccine  Hepatitis A: Verify immunization records  Hepatitis B: Verify immunization records  Tetanus/Pertussis: Not needed at this time  Pneumovax: Not needed at this time  Shingles: Not needed at this time  COVID: Does not plan to get the latest booster    Lifestyle Counseling:  Lifestyle Modifications: Continue good lifestyle choices/modifications  Safety Issues: Always wear seatbelt, Avoid texting while driving   Use sunscreen, regular skin examination  Recommended annual dental/vision examination.  Emotional/Stress/Sleep: Reviewed and  given when appropriate      Health Maintenance   Topic Date Due    COVID-19 Vaccine (2 - 2023-24 season) 04/19/2024 (Originally 9/1/2023)    INFLUENZA VACCINE  08/01/2024    ANNUAL PHYSICAL  04/17/2025    PAP SMEAR  02/14/2026    COLORECTAL CANCER SCREENING  09/01/2026    TDAP/TD VACCINES (2 - Td or Tdap) 10/26/2032    HEPATITIS C SCREENING  Completed    Pneumococcal Vaccine 0-64  Aged  Out

## 2024-04-18 LAB
ALBUMIN SERPL-MCNC: 4.7 G/DL (ref 3.5–5.2)
ALBUMIN/GLOB SERPL: 2.1 G/DL
ALP SERPL-CCNC: 63 U/L (ref 39–117)
ALT SERPL-CCNC: 16 U/L (ref 1–33)
AST SERPL-CCNC: 19 U/L (ref 1–32)
BASOPHILS # BLD AUTO: 0.04 10*3/MM3 (ref 0–0.2)
BASOPHILS NFR BLD AUTO: 0.5 % (ref 0–1.5)
BILIRUB SERPL-MCNC: 0.6 MG/DL (ref 0–1.2)
BUN SERPL-MCNC: 13 MG/DL (ref 6–20)
BUN/CREAT SERPL: 12.4 (ref 7–25)
CALCIUM SERPL-MCNC: 9.6 MG/DL (ref 8.6–10.5)
CHLORIDE SERPL-SCNC: 103 MMOL/L (ref 98–107)
CHOLEST SERPL-MCNC: 183 MG/DL (ref 0–200)
CHOLEST/HDLC SERPL: 2.06 {RATIO}
CO2 SERPL-SCNC: 23.8 MMOL/L (ref 22–29)
CREAT SERPL-MCNC: 1.05 MG/DL (ref 0.57–1)
EGFRCR SERPLBLD CKD-EPI 2021: 68.2 ML/MIN/1.73
EOSINOPHIL # BLD AUTO: 0.14 10*3/MM3 (ref 0–0.4)
EOSINOPHIL NFR BLD AUTO: 1.9 % (ref 0.3–6.2)
ERYTHROCYTE [DISTWIDTH] IN BLOOD BY AUTOMATED COUNT: 13.2 % (ref 12.3–15.4)
GLOBULIN SER CALC-MCNC: 2.2 GM/DL
GLUCOSE SERPL-MCNC: 78 MG/DL (ref 65–99)
HBA1C MFR BLD: 4.6 % (ref 4.8–5.6)
HCT VFR BLD AUTO: 37.6 % (ref 34–46.6)
HDLC SERPL-MCNC: 89 MG/DL (ref 40–60)
HGB BLD-MCNC: 12.6 G/DL (ref 12–15.9)
IMM GRANULOCYTES # BLD AUTO: 0.01 10*3/MM3 (ref 0–0.05)
IMM GRANULOCYTES NFR BLD AUTO: 0.1 % (ref 0–0.5)
LDLC SERPL CALC-MCNC: 85 MG/DL (ref 0–100)
LYMPHOCYTES # BLD AUTO: 1.91 10*3/MM3 (ref 0.7–3.1)
LYMPHOCYTES NFR BLD AUTO: 26.1 % (ref 19.6–45.3)
MCH RBC QN AUTO: 30.9 PG (ref 26.6–33)
MCHC RBC AUTO-ENTMCNC: 33.5 G/DL (ref 31.5–35.7)
MCV RBC AUTO: 92.2 FL (ref 79–97)
MONOCYTES # BLD AUTO: 0.55 10*3/MM3 (ref 0.1–0.9)
MONOCYTES NFR BLD AUTO: 7.5 % (ref 5–12)
NEUTROPHILS # BLD AUTO: 4.68 10*3/MM3 (ref 1.7–7)
NEUTROPHILS NFR BLD AUTO: 63.9 % (ref 42.7–76)
NRBC BLD AUTO-RTO: 0 /100 WBC (ref 0–0.2)
PLATELET # BLD AUTO: 237 10*3/MM3 (ref 140–450)
POTASSIUM SERPL-SCNC: 4.3 MMOL/L (ref 3.5–5.2)
PROT SERPL-MCNC: 6.9 G/DL (ref 6–8.5)
RBC # BLD AUTO: 4.08 10*6/MM3 (ref 3.77–5.28)
SODIUM SERPL-SCNC: 138 MMOL/L (ref 136–145)
T4 FREE SERPL-MCNC: 1.55 NG/DL (ref 0.93–1.7)
TRIGL SERPL-MCNC: 42 MG/DL (ref 0–150)
TSH SERPL DL<=0.005 MIU/L-ACNC: 2.03 UIU/ML (ref 0.27–4.2)
UNABLE TO VOID: NORMAL
VLDLC SERPL CALC-MCNC: 9 MG/DL (ref 5–40)
WBC # BLD AUTO: 7.33 10*3/MM3 (ref 3.4–10.8)

## 2024-04-30 ENCOUNTER — OFFICE VISIT (OUTPATIENT)
Dept: OBSTETRICS AND GYNECOLOGY | Age: 44
End: 2024-04-30
Payer: COMMERCIAL

## 2024-04-30 VITALS
HEIGHT: 70 IN | DIASTOLIC BLOOD PRESSURE: 72 MMHG | WEIGHT: 140.4 LBS | BODY MASS INDEX: 20.1 KG/M2 | SYSTOLIC BLOOD PRESSURE: 118 MMHG

## 2024-04-30 DIAGNOSIS — Z91.89 AT HIGH RISK FOR BREAST CANCER: ICD-10-CM

## 2024-04-30 DIAGNOSIS — Z01.419 WELL FEMALE EXAM WITH ROUTINE GYNECOLOGICAL EXAM: Primary | ICD-10-CM

## 2024-04-30 DIAGNOSIS — Z12.4 SCREENING FOR MALIGNANT NEOPLASM OF CERVIX: ICD-10-CM

## 2024-04-30 DIAGNOSIS — Z80.0 FAMILY HX OF COLON CANCER: ICD-10-CM

## 2024-04-30 DIAGNOSIS — Z80.3 FAMILY HISTORY OF BREAST CANCER: ICD-10-CM

## 2024-04-30 DIAGNOSIS — Z11.51 SCREENING FOR HUMAN PAPILLOMAVIRUS (HPV): ICD-10-CM

## 2024-04-30 PROCEDURE — 99459 PELVIC EXAMINATION: CPT

## 2024-04-30 PROCEDURE — 99396 PREV VISIT EST AGE 40-64: CPT

## 2024-04-30 NOTE — PROGRESS NOTES
Subjective     Chief Complaint   Patient presents with    Gynecologic Exam     Annual exam, last pap  (-), breast MRI 3/19/24 (-), no complaints       History of Present Illness    Bridget SANDEEP Oliva is a 43 y.o.  who presents for annual exam.  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         Current contraception: none  History of abnormal Pap smear: no  Received Gardasil immunization: no  Perform regular self breast exam: yes - monthly  Family history of uterine or ovarian cancer: no  Family History of colon cancer: yes - dad  Family history of breast cancer: yes - mom    Mammogram: up to date.  Colonoscopy: not indicated.  DEXA: not indicated.    Exercise: moderately active  Calcium/Vitamin D: adequate intake and uses supplements    The following portions of the patient's history were reviewed and updated as appropriate: allergies, current medications, past family history, past medical history, past social history, past surgical history, and problem list.    Review of Systems   Constitutional: Negative.    HENT: Negative.     Eyes: Negative.    Respiratory: Negative.     Cardiovascular: Negative.    Gastrointestinal: Negative.    Endocrine: Negative.    Genitourinary: Negative.    Musculoskeletal: Negative.    Skin: Negative.    Allergic/Immunologic: Negative.    Neurological: Negative.    Hematological: Negative.    Psychiatric/Behavioral: Negative.             Objective  "  Physical Exam  Vitals and nursing note reviewed.   Constitutional:       Appearance: Normal appearance.   HENT:      Head: Normocephalic.   Eyes:      Pupils: Pupils are equal, round, and reactive to light.   Cardiovascular:      Rate and Rhythm: Normal rate and regular rhythm.      Pulses: Normal pulses.      Heart sounds: Normal heart sounds.   Pulmonary:      Effort: Pulmonary effort is normal.      Breath sounds: Normal breath sounds.   Chest:   Breasts:     Right: Normal.      Left: Normal.   Abdominal:      General: Abdomen is flat. Bowel sounds are normal.      Palpations: Abdomen is soft.   Genitourinary:     General: Normal vulva.      Exam position: Lithotomy position.      Vagina: Normal.      Cervix: Normal.      Uterus: Normal.       Adnexa: Right adnexa normal and left adnexa normal.      Rectum: Normal.   Musculoskeletal:         General: Normal range of motion.      Cervical back: Full passive range of motion without pain and normal range of motion.      Right lower leg: No edema.      Left lower leg: No edema.   Lymphadenopathy:      Head:      Right side of head: No submental or submandibular adenopathy.      Left side of head: No submental or submandibular adenopathy.   Skin:     General: Skin is warm and dry.   Neurological:      General: No focal deficit present.      Mental Status: She is alert.      Gait: Gait is intact.   Psychiatric:         Attention and Perception: Attention normal.         Mood and Affect: Mood normal.         Speech: Speech normal.         /72   Ht 177.8 cm (70\")   Wt 63.7 kg (140 lb 6.4 oz)   LMP 04/08/2024 (Exact Date)   BMI 20.15 kg/m²     Assessment & Plan   Diagnoses and all orders for this visit:    1. Well female exam with routine gynecological exam (Primary)  -     IGP, Apt HPV,rfx 16 / 18,45    2. Family hx of colon cancer    3. Family history of breast cancer    4. Screening for human papillomavirus (HPV)  -     IGP, Apt HPV,rfx 16 / 18,45    5. " Screening for malignant neoplasm of cervix  -     IGP, Apt HPV,rfx 16 / 18,45    6. At high risk for breast cancer      Pap today discussed guidelines  All questions answered.  Breast self exam technique reviewed and patient encouraged to perform self-exam monthly.  Discussed healthy lifestyle modifications.  Recommended 30 minutes of aerobic exercise five times per week.  Discussed vit d and calcium needs to prevent osteoporosis.               Abbie Richards, APRN  4/30/2024, 15:53 EDT

## 2024-05-06 LAB
CYTOLOGIST CVX/VAG CYTO: NORMAL
CYTOLOGY CVX/VAG DOC CYTO: NORMAL
CYTOLOGY CVX/VAG DOC THIN PREP: NORMAL
DX ICD CODE: NORMAL
HPV I/H RISK 4 DNA CVX QL PROBE+SIG AMP: NEGATIVE
Lab: NORMAL
OTHER STN SPEC: NORMAL
STAT OF ADQ CVX/VAG CYTO-IMP: NORMAL

## 2024-06-26 DIAGNOSIS — F32.A ANXIETY AND DEPRESSION: ICD-10-CM

## 2024-06-26 DIAGNOSIS — F41.9 ANXIETY AND DEPRESSION: ICD-10-CM

## 2024-06-26 RX ORDER — CITALOPRAM 20 MG/1
20 TABLET ORAL DAILY
Qty: 90 TABLET | Refills: 1 | Status: SHIPPED | OUTPATIENT
Start: 2024-06-26

## 2024-09-09 NOTE — TELEPHONE ENCOUNTER
Cld pt and sched her an OV with Dr Boucher 02/03/22 @8:15a.m.  Cld pts Pharmacy - spoke with Andriy Royal [phone]    Pt saw you in 08/2021 for a seb cyst on abd wall.  She stated she is supposed to see you again when/if it ever flares up.    She is calling this morning stating that it is red, swollen and has an opening to it.  She does not have a fever.    Pls advise on scheduling.    [phone]  
No

## 2024-10-04 ENCOUNTER — HOSPITAL ENCOUNTER (OUTPATIENT)
Dept: MAMMOGRAPHY | Facility: HOSPITAL | Age: 44
Discharge: HOME | End: 2024-10-04
Admitting: NURSE PRACTITIONER
Payer: COMMERCIAL

## 2024-10-04 DIAGNOSIS — Z12.31 ENCOUNTER FOR SCREENING MAMMOGRAM FOR MALIGNANT NEOPLASM OF BREAST: ICD-10-CM

## 2024-10-04 PROCEDURE — 77067 SCR MAMMO BI INCL CAD: CPT

## 2024-10-04 PROCEDURE — 77063 BREAST TOMOSYNTHESIS BI: CPT

## 2024-10-17 NOTE — PROGRESS NOTES
[facility]     Referring Provider: TIANA Mcnulty     Chief complaint: family history breast cancer     HPI: Ms. Bridget Oliva is a 44 yo woman, seen at the request of TIANA Mcnulty, for family history of breast cancer    I personally reviewed her records and summarized her relevant breast history/imagin2021 bilateral screening [facility]ogram [facility]  Breast are heterogeneously dense.  No suspicious masses significant calcifications or other abnormalities are seen.  Impression  There is no mammographic evidence of malignancy.  BI-RADS 1    2022 bilateral screening mammogram at [facility]  The breasts are heterogeneously dense.  No suspicious masses significant calcifications or other abnormalities are seen.  Impression  There is no mammographic evidence of  BI-RADS 1    She has a family history of breast cancer in her mother age 40 and 67.  She denies any family history of ovarian cancer.     Today she presents with concerns regarding family history of breast cancer. Her mom was dx at age 40 and just dx for the second time at age 67.  Her mom did have genetic testing over 10 years ago that she believes was negative.    She denies any breast lumps, pain, skin changes, or nipple discharge.  She denies any prior history of abnormal mammograms or breast biopsies.     3/22/2044   Patient presenting to the office today for routine follow-up.  She has no new breast complaints or concerns today.  She had a bilateral screening mammogram on 10/3/2020 that resulted as BI-RADS 1 she then had a breast MRI on 3/19/2024 that also returned as BI-RADS 1.    10/18/2024 interval history  Patient presenting to the office today for routine follow-up.  On 10/4/2024 she had a bilateral screening mammogram that resulted as BI-RADS 1. She had roya gel implants placed last week.  She has no new breast complaints or concerns today. Her mom was genetically tested with negative results.       Review of  Systems - Oncology    Medications:    Current Outpatient Medications:     citalopram (CeleXA) 20 MG tablet, TAKE 1 TABLET DAILY, Disp: 90 tablet, Rfl: 1    Mounjaro 5 MG/0.5ML solution pen-injector pen, INJECT 5 MG UNDER THE SKIN ONE DAY A WEEK, Disp: , Rfl:     Allergies:  No Known Allergies    Medical history:  Past Medical History:   Diagnosis Date    Anxiety     Family history of colon cancer     Skin cyst        Surgical History:  Past Surgical History:   Procedure Laterality Date    COLONOSCOPY N/A 2021    Procedure: COLONOSCOPY TO CECUM WITH HOT POLYPECTOMIES;  Surgeon: Jj Boucher MD;  Location: [facility] ENDOSCOPY;  Service: General;  Laterality: N/A;  FAMILY HISTORY OF COLON CANCER  COLON POLYPS    EXCISION MASS TRUNK N/A 2022    Procedure: TRUNK CYST EXCISION;  Surgeon: Jj Boucher MD;  Location: [facility] OR [facility];  Service: General;  Laterality: N/A;    INCISION AND DRAINAGE ABSCESS Left 2020    IN-OFFICE PROCEDURE: Incision and drainage of infected sebaceous cyst left upper quadrant abdominal wall - Dr. Jj Boucher       Family History:  Family History   Problem Relation Age of Onset    Breast cancer Mother     Skin cancer Mother     Colon cancer Father 59    Diabetes type II Maternal Grandfather     Heart failure Paternal Grandmother     Malig Hyperthermia Neg Hx        Social History:   Social History     Socioeconomic History    Marital status: Single   Tobacco Use    Smoking status: Never     Passive exposure: Never    Smokeless tobacco: Never   Vaping Use    Vaping status: Never Used   Substance and Sexual Activity    Alcohol use: Yes     Alcohol/week: 3.0 - 5.0 standard drinks of alcohol     Types: 3 - 5 Standard drinks or equivalent per week     Comment: weekly    Drug use: Never    Sexual activity: Not Currently     Partners: Male     Birth control/protection: Condom     Patient drinks 0 servings of caffeine per day.       GYNECOLOGIC HISTORY:   . P: 0. AB: 0.  Last  menstrual period: 09/10/2023  Age at menarche: 14  Age at first childbirth: N/A  Lactation/How long: N/A  Age at menopause: N/A  Total years of oral contraceptive use: 10 years   Total years of hormone replacement therapy: N/A      Physical Exam  There were no vitals filed for this visit.    ECOG 0 - Asymptomatic  General: NAD, well appearing  Psych: a&o x 3, normal mood and affect  Eyes: EOMI, no scleral icterus  ENMT: neck supple without masses or thyromegaly, mucus membranes moist  Resp: normal effort, CTAB  CV: RRR, no murmurs, no edema   GI: soft, NT, ND  MSK: normal gait, normal ROM in bilateral shoulders  Lymph nodes: no cervical, supraclavicular or axillary lymphadenopathy  Breast: symmetric, small  Right: No visible abnormalities on inspection while seated, with arms raised or hands on hips. No masses, skin changes, or nipple abnormalities. Implant in place.  Inframammary fold stitches from recent augmentation  Left: No visible abnormalities on inspection while seated, with arms raised or hands on hips. No masses, skin changes, or nipple abnormalities. Implant in place. Inframammary fold stitches from recent augmentation    Imaging:  10/3/2023 bilateral screening mammogram [facility]  FINDINGS:  The breasts are heterogeneously dense, which may obscure small masses.  There are no suspicious masses, calcifications, or areas of  architectural distortion.  IMPRESSION/RECOMMENDATION(S):  No mammographic evidence of malignancy. Recommend annual screening  mammogram in one year. Supplemental screening MRI should be considered  due to dense breast tissue and the patient's reported family history of  breast cancer in her mother at age 40.  BI-RADS Category 1: Negative      3/19/2024 bilateral breast MRI [facility]  FINDINGS: Heterogenous fibroglandular tissue is seen throughout both  breasts. Minimal background parenchymal enhancement of both breasts is  noted. No areas of suspicious enhancement or morphology are seen in  either  breast. I see no evidence for abnormal skin, nipple or chest wall  enhancement of either breast and there is no evidence for axillary or  internal mammary chain adenopathy.  IMPRESSION:  There are no findings suspicious for malignancy in either  breast. Routine follow-up imaging is recommended.  BI-RADS CATEGORY 1: Negative.    10/4/2024 bilateral screening mammogram at [facility]  The breasts are extremely dense, which lowers the sensitivity of  mammography.  There are no suspicious masses, calcifications, or areas of  architectural distortion.  IMPRESSION/RECOMMENDATION(S):  No mammographic evidence of malignancy. Recommend annual screening  mammogram in one year.  Note: Given the patient's breast density, recommend supplemental  screening breast MRI.  BI-RADS Category 1: Negative       Assessment:    Family history of breat cancer  Dense breast  High risk for breast cancer- 22.3% according to [facility]      Discussion:  Breast density describes how the breasts look on a mammogram.  Breast and connective tissue are denser than fat and this difference shows up on the mammogram.  Young women often have dense breasts.  As we age, breast become less dense.  Dense breast can make it harder to find breast cancer on the mammogram.  Women with high breast density have an increased risk of breast cancer.  Educational materials regarding breast density were given and reviewed.  Tomosynthesis imaging will be completed with next screening study.  We discussed management options for individuals who are at increased risk (>20% lifetime risk):  1) High risk screening - Annual mammogram and annual breast MRI, alternating one test every 6 months, biannual clinical exam and monthly self breast exam. She meets criteria for high risk screening.  2) Chemoprevention with Tamoxifen, Raloxifene or Exemestane. These may reduce risk up to 50%. I reviewed that these particular medications are not without risks and the risk/benefit ratio must be  considered carefully. She is not interested in this currently.  3) Risk reducing surgery such as prophylactic mastectomy  which may reduce risk by 90-95%. We discussed that this is a relatively radical strategy and is generally reserved for individuals with a known genetic mutation predisposing them to an increased risk (~50% risk) of breast cancer. She does not meet criteria for risk reducing surgery.   4) I discussed the importance of exercise and weight management as part of a risk reducing strategy, since increased BMI is associated with an increased risk of breast cancer. This is especially true in her case, as I expect her risk would decrease as she lost weight.    Risk reducing agents should be recommended when 5 year risk per Florencia model is at least 3% or 10 year risk by Tyrer Cuzick model is at least 5%.  Referral to medical oncology made to discuss.      Plan:  Breast MRI in March followed by exam    TIANA Lofton    I have spent 20 mins in face to face time with the patient and in chart review.    CC:  TIANA Mcnulty Amanda, APRN    EMR Dragon/transcription disclaimer:  Dictated using Dragon dictation

## 2024-10-18 ENCOUNTER — OFFICE VISIT (OUTPATIENT)
Dept: SURGERY | Facility: CLINIC | Age: 44
End: 2024-10-18
Payer: COMMERCIAL

## 2024-10-18 ENCOUNTER — TELEPHONE (OUTPATIENT)
Dept: SURGERY | Facility: CLINIC | Age: 44
End: 2024-10-18
Payer: COMMERCIAL

## 2024-10-18 VITALS
BODY MASS INDEX: 20.76 KG/M2 | DIASTOLIC BLOOD PRESSURE: 78 MMHG | SYSTOLIC BLOOD PRESSURE: 122 MMHG | OXYGEN SATURATION: 99 % | WEIGHT: 145 LBS | HEART RATE: 84 BPM | HEIGHT: 70 IN

## 2024-10-18 DIAGNOSIS — Z91.89 AT HIGH RISK FOR BREAST CANCER: Primary | ICD-10-CM

## 2024-10-18 NOTE — TELEPHONE ENCOUNTER
----- Message from Kenya DERAS sent at 10/18/2024 12:51 PM EDT -----  Contact: [phone]  Wants to change the date of the appt u scheduled

## 2024-10-18 NOTE — TELEPHONE ENCOUNTER
Jourdanm for pt to let her know I moved her appt to 04/11 and if that does not work to give me a call back

## 2024-12-23 DIAGNOSIS — F32.A ANXIETY AND DEPRESSION: ICD-10-CM

## 2024-12-23 DIAGNOSIS — F41.9 ANXIETY AND DEPRESSION: ICD-10-CM

## 2024-12-23 RX ORDER — CITALOPRAM HYDROBROMIDE 20 MG/1
20 TABLET ORAL DAILY
Qty: 90 TABLET | Refills: 3 | OUTPATIENT
Start: 2024-12-23

## 2025-02-12 DIAGNOSIS — F32.A ANXIETY AND DEPRESSION: ICD-10-CM

## 2025-02-12 DIAGNOSIS — F41.9 ANXIETY AND DEPRESSION: ICD-10-CM

## 2025-02-12 RX ORDER — CITALOPRAM HYDROBROMIDE 20 MG/1
20 TABLET ORAL DAILY
Qty: 90 TABLET | Refills: 1 | OUTPATIENT
Start: 2025-02-12

## 2025-02-12 NOTE — TELEPHONE ENCOUNTER
Caller: Bridget Oliva    Relationship: Self    Best call back number: [phone]    Requested Prescriptions:   Requested Prescriptions     Pending Prescriptions Disp Refills    citalopram (CeleXA) 20 MG tablet 90 tablet 1     Sig: Take 1 tablet by mouth Daily.        Pharmacy where request should be sent: [address] [phone] [facility] [phone] [facility]     Last office visit with prescribing clinician: 4/17/2024   Last telemedicine visit with prescribing clinician: Visit date not found   Next office visit with prescribing clinician: Visit date not found     Additional details provided by patient:     GOING OUT OF TOWN IN A WEEK     NEEDS THIS ASAP     Risa gN, SCAR   02/12/25 09:54 EST

## 2025-02-13 ENCOUNTER — TELEPHONE (OUTPATIENT)
Dept: INTERNAL MEDICINE | Age: 45
End: 2025-02-13
Payer: COMMERCIAL

## 2025-02-13 DIAGNOSIS — F41.9 ANXIETY AND DEPRESSION: ICD-10-CM

## 2025-02-13 DIAGNOSIS — F32.A ANXIETY AND DEPRESSION: ICD-10-CM

## 2025-02-13 RX ORDER — CITALOPRAM HYDROBROMIDE 20 MG/1
20 TABLET ORAL DAILY
Qty: 90 TABLET | Refills: 0 | Status: SHIPPED | OUTPATIENT
Start: 2025-02-13

## 2025-02-13 NOTE — TELEPHONE ENCOUNTER
Patient called.    They are going to be out of their medication in five days.  They are also going on a two week trip on Friday for 2 weeks.    They cannot wait for their physical in April for their medication.     Can they please have a refill to get them through until April or can they please have enough to get them through their trip so they can possibly be seen in March?     Please call patient back as soon as possible.

## 2025-04-01 ENCOUNTER — HOSPITAL ENCOUNTER (OUTPATIENT)
Dept: MRI IMAGING | Facility: HOSPITAL | Age: 45
Discharge: HOME | End: 2025-04-01
Admitting: NURSE PRACTITIONER
Payer: COMMERCIAL

## 2025-04-01 DIAGNOSIS — Z91.89 AT HIGH RISK FOR BREAST CANCER: ICD-10-CM

## 2025-04-01 PROCEDURE — 25510000002 GADOBENATE DIMEGLUMINE 529 MG/ML SOLUTION: Performed by: NURSE PRACTITIONER

## 2025-04-01 PROCEDURE — A9577 INJ MULTIHANCE: HCPCS | Performed by: NURSE PRACTITIONER

## 2025-04-01 PROCEDURE — 77049 MRI BREAST C-+ W/CAD BI: CPT

## 2025-04-01 RX ADMIN — GADOBENATE DIMEGLUMINE 13 ML: 529 INJECTION, SOLUTION INTRAVENOUS at 18:40

## 2025-04-10 NOTE — PROGRESS NOTES
[facility]     Referring Provider: No ref. provider found     Chief complaint: family history breast cancer, high risk      HPI: Ms. Bridget Oliva is a 42 yo woman, seen at the request of No ref. provider found, for family history of breast cancer    I personally reviewed her records and summarized her relevant breast history/imagin2021 [facility]  Breast are heterogeneously dense.  No suspicious masses significant calcifications or other abnormalities are seen.  Impression  There is no mammographic evidence of malignancy.  BI-RADS 1    2022 [facility]  The breasts are heterogeneously dense.  No suspicious masses significant calcifications or other abnormalities are seen.  Impression  There is no mammographic evidence of  BI-RADS 1    She has a family history of breast cancer in her mother age 40 and 67.  She denies any family history of ovarian cancer.     Today she presents with concerns regarding family history of breast cancer. Her mom was dx at age 40 and just dx for the second time at age 67.  Her mom did have genetic testing over 10 years ago that she believes was negative.    She denies any breast lumps, pain, skin changes, or nipple discharge.  She denies any prior history of abnormal mammograms or breast biopsies.     3/22/2044   Patient presenting to the office today for routine follow-up.  She has no new breast complaints or concerns today.  She had a bilateral screening mammogram on 10/3/2020 that resulted as BI-RADS 1 she then had a breast MRI on 3/19/2024 that also returned as BI-RADS 1.    10/18/2024   Patient presenting to the office today for routine follow-up.  On 10/4/2024 she had a bilateral screening mammogram that resulted as BI-RADS 1. She had roya gel implants placed last week.  She has no new breast complaints or concerns today. Her mom was genetically tested with negative results.     2025  interval history  Patient presenting to the office today for routine follow-up.  On 4/1/2025 she had a bilateral breast MRI that resulted as BI-RADS 2.  She has no new breast complaints or concerns today.    Review of Systems - Oncology    Medications:    Current Outpatient Medications:     Chlorcyclizine-Pseudoephed (Stahist AD) 25-60 MG tablet, One tablet three times a day as needed for congestion, Disp: 30 tablet, Rfl: 0    citalopram (CeleXA) 20 MG tablet, Take 1 tablet by mouth Daily., Disp: 90 tablet, Rfl: 0    fluticasone (FLONASE) 50 MCG/ACT nasal spray, Administer 2 sprays into the nostril(s) as directed by provider Daily., Disp: 16 g, Rfl: 0    Mounjaro 5 MG/0.5ML solution pen-injector pen, INJECT 5 MG UNDER THE SKIN ONE DAY A WEEK, Disp: , Rfl:     oseltamivir (Tamiflu) 75 MG capsule, 1 capsule PO BID, Disp: 10 capsule, Rfl: 0    predniSONE (DELTASONE) 50 MG tablet, Take 1 tablet by mouth Daily., Disp: 5 tablet, Rfl: 0    promethazine-dextromethorphan (PROMETHAZINE-DM) 6.25-15 MG/5ML syrup, 5 ml PO Q 4-6 hours prn cough.  Max:  30 ml per 24 hours., Disp: 240 mL, Rfl: 0    Allergies:  No Known Allergies    Medical history:  Past Medical History:   Diagnosis Date    Anxiety     Family history of colon cancer     Skin cyst        Surgical History:  Past Surgical History:   Procedure Laterality Date    COLONOSCOPY N/A 9/1/2021    Procedure: COLONOSCOPY TO CECUM WITH HOT POLYPECTOMIES;  Surgeon: Jj Boucher MD;  Location: [facility] ENDOSCOPY;  Service: General;  Laterality: N/A;  FAMILY HISTORY OF COLON CANCER  COLON POLYPS    EXCISION MASS TRUNK N/A 2/17/2022    Procedure: TRUNK CYST EXCISION;  Surgeon: Jj Boucher MD;  Location: [facility] OR [facility] [place];  Service: General;  Laterality: N/A;    INCISION AND DRAINAGE ABSCESS Left 12/03/2020    IN-OFFICE PROCEDURE: Incision and drainage of infected sebaceous cyst left upper quadrant abdominal wall - Dr. Jj Boucher       Family History:  Family History    Problem Relation Age of Onset    Breast cancer Mother     Skin cancer Mother     Colon cancer Father 59    Diabetes type II Maternal Grandfather     Heart failure Paternal Grandmother     Malig Hyperthermia Neg Hx        Social History:   Social History     Socioeconomic History    Marital status: Single   Tobacco Use    Smoking status: Never     Passive exposure: Never    Smokeless tobacco: Never   Vaping Use    Vaping status: Never Used   Substance and Sexual Activity    Alcohol use: Yes     Alcohol/week: 3.0 - 5.0 standard drinks of alcohol     Types: 3 - 5 Standard drinks or equivalent per week     Comment: weekly    Drug use: Never    Sexual activity: Not Currently     Partners: Male     Birth control/protection: Condom     Patient drinks 0 servings of caffeine per day.       GYNECOLOGIC HISTORY:   . P: 0. AB: 0.  Last menstrual period: 09/10/2023  Age at menarche: 14  Age at first childbirth: N/A  Lactation/How long: N/A  Age at menopause: N/A  Total years of oral contraceptive use: 10 years   Total years of hormone replacement therapy: N/A      Physical Exam  There were no vitals filed for this visit.    ECOG 0 - Asymptomatic  General: NAD, well appearing  Psych: a&o x 3, normal mood and affect  Eyes: EOMI, no scleral icterus  ENMT: neck supple without masses or thyromegaly, mucus membranes moist  Resp: normal effort, CTAB  CV: RRR, no murmurs, no edema   GI: soft, NT, ND  MSK: normal gait, normal ROM in bilateral shoulders  Lymph nodes: no cervical, supraclavicular or axillary lymphadenopathy  Breast: symmetric, implants  Right: No visible abnormalities on inspection while seated, with arms raised or hands on hips. No masses, skin changes, or nipple abnormalities. Implant in place.  Inframammary fold stitches from recent augmentation  Left: No visible abnormalities on inspection while seated, with arms raised or hands on hips. No masses, skin changes, or nipple abnormalities. Implant in place.  Inframammary fold stitches from recent augmentation    Imaging:  10/3/2023 bilateral screening mammogram [facility]  FINDINGS:  The breasts are heterogeneously dense, which may obscure small masses.  There are no suspicious masses, calcifications, or areas of  architectural distortion.  IMPRESSION/RECOMMENDATION(S):  No mammographic evidence of malignancy. Recommend annual screening  mammogram in one year. Supplemental screening MRI should be considered  due to dense breast tissue and the patient's reported family history of  breast cancer in her mother at age 40.  BI-RADS Category 1: Negative      3/19/2024 bilateral breast MRI [facility]  FINDINGS: Heterogenous fibroglandular tissue is seen throughout both  breasts. Minimal background parenchymal enhancement of both breasts is  noted. No areas of suspicious enhancement or morphology are seen in  either breast. I see no evidence for abnormal skin, nipple or chest wall  enhancement of either breast and there is no evidence for axillary or  internal mammary chain adenopathy.  IMPRESSION:  There are no findings suspicious for malignancy in either  breast. Routine follow-up imaging is recommended.  BI-RADS CATEGORY 1: Negative.    10/4/2024 bilateral screening mammogram at [facility]  The breasts are extremely dense, which lowers the sensitivity of  mammography.  There are no suspicious masses, calcifications, or areas of  architectural distortion.  IMPRESSION/RECOMMENDATION(S):  No mammographic evidence of malignancy. Recommend annual screening  mammogram in one year.  Note: Given the patient's breast density, recommend supplemental  screening breast MRI.  BI-RADS Category 1: Negative     4/1/2025 bilateral breast MRI at [facility]  FINDINGS: There is heterogeneous fibroglandular tissue. There is mild  background parenchymal enhancement.   There are bilateral prepectoral silicone breast implants, new from  October. Implants are intact.  RIGHT BREAST:    No suspicious enhancing mass or area of  non-mass enhancement is  identified.  The visualized axilla is within normal limits.  LEFT BREAST:    No suspicious enhancing mass or area of non-mass enhancement is  identified.  The visualized axilla is within normal limits.  EXTRAMAMMARY FINDINGS:  There are no pathologically enlarged internal mammary chain lymph nodes  on either side.    IMPRESSION AND RECOMMENDATION:  No MRI evidence of malignancy in either breast. New intact bilateral  prepectoral silicone breast implants. Recommend annual screening  mammogram in October 2025.  BI-RADS Category 2: Benign    Assessment:    Family history of breat cancer  Dense breast  High risk for breast cancer- 22.3% according to [facility]      Discussion:  Breast density describes how the breasts look on a mammogram.  Breast and connective tissue are denser than fat and this difference shows up on the mammogram.  Young women often have dense breasts.  As we age, breast become less dense.  Dense breast can make it harder to find breast cancer on the mammogram.  Women with high breast density have an increased risk of breast cancer.  Educational materials regarding breast density were given and reviewed.  Tomosynthesis imaging will be completed with next screening study.  We discussed management options for individuals who are at increased risk (>20% lifetime risk):  1) High risk screening - Annual mammogram and annual breast MRI, alternating one test every 6 months, biannual clinical exam and monthly self breast exam. She meets criteria for high risk screening.  2) Chemoprevention with Tamoxifen, Raloxifene or Exemestane. These may reduce risk up to 50%. I reviewed that these particular medications are not without risks and the risk/benefit ratio must be considered carefully. She is not interested in this currently.  3) Risk reducing surgery such as prophylactic mastectomy  which may reduce risk by 90-95%. We discussed that this is a relatively radical strategy and is generally  reserved for individuals with a known genetic mutation predisposing them to an increased risk (~50% risk) of breast cancer. She does not meet criteria for risk reducing surgery.   4) I discussed the importance of exercise and weight management as part of a risk reducing strategy, since increased BMI is associated with an increased risk of breast cancer. This is especially true in her case, as I expect her risk would decrease as she lost weight.    Risk reducing agents should be recommended when 5 year risk per Florencia model is at least 3% or 10 year risk by Tyrer Cuzick model is at least 5%.  Referral to medical oncology made to discuss.      Plan:  Mammo and exam in oct    TIANA Lofton    I have spent 20 mins in face to face time with the patient and in chart review.    CC:  No ref. provider found  Marisol Dalal APRN    EMR Dragon/transcription disclaimer:  Dictated using Dragon dictation

## 2025-04-11 ENCOUNTER — OFFICE VISIT (OUTPATIENT)
Dept: SURGERY | Facility: CLINIC | Age: 45
End: 2025-04-11
Payer: COMMERCIAL

## 2025-04-11 VITALS — SYSTOLIC BLOOD PRESSURE: 138 MMHG | DIASTOLIC BLOOD PRESSURE: 80 MMHG

## 2025-04-11 DIAGNOSIS — Z12.31 ENCOUNTER FOR SCREENING MAMMOGRAM FOR MALIGNANT NEOPLASM OF BREAST: ICD-10-CM

## 2025-04-11 DIAGNOSIS — Z91.89 AT HIGH RISK FOR BREAST CANCER: Primary | ICD-10-CM

## 2025-04-21 ENCOUNTER — OFFICE VISIT (OUTPATIENT)
Dept: INTERNAL MEDICINE | Age: 45
End: 2025-04-21
Payer: COMMERCIAL

## 2025-04-21 VITALS
RESPIRATION RATE: 18 BRPM | SYSTOLIC BLOOD PRESSURE: 122 MMHG | BODY MASS INDEX: 21.18 KG/M2 | HEIGHT: 69 IN | OXYGEN SATURATION: 100 % | DIASTOLIC BLOOD PRESSURE: 82 MMHG | TEMPERATURE: 96.8 F | HEART RATE: 57 BPM | WEIGHT: 143 LBS

## 2025-04-21 DIAGNOSIS — Z00.00 ANNUAL PHYSICAL EXAM: Primary | ICD-10-CM

## 2025-04-21 DIAGNOSIS — F32.A ANXIETY AND DEPRESSION: ICD-10-CM

## 2025-04-21 DIAGNOSIS — F41.9 ANXIETY AND DEPRESSION: ICD-10-CM

## 2025-04-21 DIAGNOSIS — R79.89 ELEVATED SERUM CREATININE: ICD-10-CM

## 2025-04-21 LAB
ALBUMIN SERPL-MCNC: 4.5 G/DL (ref 3.5–5.2)
ALBUMIN/GLOB SERPL: 2 G/DL
ALP SERPL-CCNC: 68 U/L (ref 39–117)
ALT SERPL-CCNC: 15 U/L (ref 1–33)
AST SERPL-CCNC: 21 U/L (ref 1–32)
BASOPHILS # BLD AUTO: 0.04 10*3/MM3 (ref 0–0.2)
BASOPHILS NFR BLD AUTO: 0.6 % (ref 0–1.5)
BILIRUB SERPL-MCNC: 0.4 MG/DL (ref 0–1.2)
BUN SERPL-MCNC: 15 MG/DL (ref 6–20)
BUN/CREAT SERPL: 14.6 (ref 7–25)
CALCIUM SERPL-MCNC: 9.3 MG/DL (ref 8.6–10.5)
CHLORIDE SERPL-SCNC: 104 MMOL/L (ref 98–107)
CHOLEST SERPL-MCNC: 163 MG/DL (ref 0–200)
CHOLEST/HDLC SERPL: 1.92 {RATIO}
CO2 SERPL-SCNC: 24.4 MMOL/L (ref 22–29)
CREAT SERPL-MCNC: 1.03 MG/DL (ref 0.57–1)
EGFRCR SERPLBLD CKD-EPI 2021: 69.3 ML/MIN/1.73
EOSINOPHIL # BLD AUTO: 0.09 10*3/MM3 (ref 0–0.4)
EOSINOPHIL NFR BLD AUTO: 1.3 % (ref 0.3–6.2)
ERYTHROCYTE [DISTWIDTH] IN BLOOD BY AUTOMATED COUNT: 12.7 % (ref 12.3–15.4)
GLOBULIN SER CALC-MCNC: 2.2 GM/DL
GLUCOSE SERPL-MCNC: 77 MG/DL (ref 65–99)
HBA1C MFR BLD: 4.4 % (ref 4.8–5.6)
HCT VFR BLD AUTO: 36.4 % (ref 34–46.6)
HDLC SERPL-MCNC: 85 MG/DL (ref 40–60)
HGB BLD-MCNC: 12.1 G/DL (ref 12–15.9)
IMM GRANULOCYTES # BLD AUTO: 0.01 10*3/MM3 (ref 0–0.05)
IMM GRANULOCYTES NFR BLD AUTO: 0.1 % (ref 0–0.5)
LDLC SERPL CALC-MCNC: 69 MG/DL (ref 0–100)
LYMPHOCYTES # BLD AUTO: 1.64 10*3/MM3 (ref 0.7–3.1)
LYMPHOCYTES NFR BLD AUTO: 23.6 % (ref 19.6–45.3)
MCH RBC QN AUTO: 30.4 PG (ref 26.6–33)
MCHC RBC AUTO-ENTMCNC: 33.2 G/DL (ref 31.5–35.7)
MCV RBC AUTO: 91.5 FL (ref 79–97)
MONOCYTES # BLD AUTO: 0.5 10*3/MM3 (ref 0.1–0.9)
MONOCYTES NFR BLD AUTO: 7.2 % (ref 5–12)
NEUTROPHILS # BLD AUTO: 4.68 10*3/MM3 (ref 1.7–7)
NEUTROPHILS NFR BLD AUTO: 67.2 % (ref 42.7–76)
NRBC BLD AUTO-RTO: 0 /100 WBC (ref 0–0.2)
PLATELET # BLD AUTO: 215 10*3/MM3 (ref 140–450)
POTASSIUM SERPL-SCNC: 4.4 MMOL/L (ref 3.5–5.2)
PROT SERPL-MCNC: 6.7 G/DL (ref 6–8.5)
RBC # BLD AUTO: 3.98 10*6/MM3 (ref 3.77–5.28)
SODIUM SERPL-SCNC: 139 MMOL/L (ref 136–145)
T4 FREE SERPL-MCNC: 1.26 NG/DL (ref 0.92–1.68)
TRIGL SERPL-MCNC: 37 MG/DL (ref 0–150)
TSH SERPL DL<=0.005 MIU/L-ACNC: 1.68 UIU/ML (ref 0.27–4.2)
UNABLE TO VOID: NORMAL
VLDLC SERPL CALC-MCNC: 9 MG/DL (ref 5–40)
WBC # BLD AUTO: 6.96 10*3/MM3 (ref 3.4–10.8)

## 2025-04-21 PROCEDURE — 99396 PREV VISIT EST AGE 40-64: CPT

## 2025-04-21 NOTE — PROGRESS NOTES
"    I N T E R N A L  M E D I C I N E  Marisol Dalal, TIANA      ENCOUNTER DATE:  04/21/2025    Bridget SANDEEP Pj / 44 y.o. / female      CHIEF COMPLAINT     Annual Exam    Last seen April 2024.    BMI 21: Today's weight is 143 pounds, stable.  No longer using GLP-1 agent for last several months.  Continues to follow healthy diet, engage in regular exercise.       Anxiety and Depression: Symptoms well controlled on citalopram 20 mg daily.  She denies any side effects, SI/ HI.  No dysphoric mood.  Now seeing therapist, once every two weeks with benefit.     Followed by breast surgery, TIANA Lofton for family history of breast cancer in mom.  Last seen April 2025.  Plan for mammogram with exam in October 2024.      Followed by OBGYN, TIANA William.  May 2024 pap negative.     She has a history of elevated creatinine for which she was  followed by nephrology but has not been seen in some time.  Asymptomatic.  April 2024 creatinine 1.05; GFR 68.2.     Colonoscopy done 2021 by Dr. Boucher, recommended repeat in 5 years.  Denies any changes in bowel habits.  Family hx significant for colon CA in dad.      VITALS     Visit Vitals  /82 (BP Location: Left arm, Patient Position: Sitting, Cuff Size: Adult)   Pulse 57   Temp 96.8 °F (36 °C) (Temporal)   Resp 18   Ht 175.3 cm (69.02\")   Wt 64.9 kg (143 lb)   LMP 04/01/2025   SpO2 100%   BMI 21.11 kg/m²       BP Readings from Last 3 Encounters:   04/21/25 122/82   04/11/25 138/80   03/29/25 127/87     Wt Readings from Last 3 Encounters:   04/21/25 64.9 kg (143 lb)   03/29/25 63.5 kg (140 lb)   02/16/25 63.5 kg (140 lb)      Body mass index is 21.11 kg/m².       MEDICATIONS     Current Outpatient Medications on File Prior to Visit   Medication Sig Dispense Refill    citalopram (CeleXA) 20 MG tablet Take 1 tablet by mouth Daily. 90 tablet 0    [DISCONTINUED] Chlorcyclizine-Pseudoephed (Stahist AD) 25-60 MG tablet One tablet three times a day as needed for congestion " 30 tablet 0    [DISCONTINUED] fluticasone (FLONASE) 50 MCG/ACT nasal spray Administer 2 sprays into the nostril(s) as directed by provider Daily. 16 g 0    [DISCONTINUED] Mounjaro 5 MG/0.5ML solution pen-injector pen INJECT 5 MG UNDER THE SKIN ONE DAY A WEEK      [DISCONTINUED] oseltamivir (Tamiflu) 75 MG capsule 1 capsule PO BID 10 capsule 0    [DISCONTINUED] predniSONE (DELTASONE) 50 MG tablet Take 1 tablet by mouth Daily. 5 tablet 0    [DISCONTINUED] promethazine-dextromethorphan (PROMETHAZINE-DM) 6.25-15 MG/5ML syrup 5 ml PO Q 4-6 hours prn cough.  Max:  30 ml per 24 hours. 240 mL 0     No current facility-administered medications on file prior to visit.         HISTORY OF PRESENT ILLNESS     Bridget presents for annual health maintenance visit.    General health: excellent  Lifestyle:  Attempting to lose weight?: No   Diet: eats a well balanced, healthy diet  Exercise: exercises 5 days weekly, home gym  Tobacco: Never used   Alcohol: 3 days/week and 2 drinks/occasion  Work: Full-time  Reproductive health:  Sexually active?: Yes   Sexual problems?: No problems  Concern for STD?: No    Sees Gynecologist?: Yes   Jennie/Postmenopausal?: No   Domestic abuse concerns: No   Depression Screenin/21/2025    11:06 AM   PHQ-2/PHQ-9 Depression Screening   Little interest or pleasure in doing things Not at all   Feeling down, depressed, or hopeless Not at all   How difficult have these problems made it for you to do your work, take care of things at home, or get along with other people? Not difficult at all         PHQ-2: On medication   PHQ-9: Well controlled, denies any dysphoric mood symptoms    Patient Care Team:  Marisol Dalal APRN as PCP - General (Family Medicine)  Natasha Fink MD as Consulting Physician (Obstetrics and Gynecology)  Emmie Huitron APRN as Nurse Practitioner (Obstetrics)  Bob Galindo MD as Consulting Physician (Nephrology)      ALLERGIES  No Known Allergies     PFSH:     The  following portions of the patient's history were reviewed and updated as appropriate: Allergies / Current Medications / Past Medical History / Surgical History / Social History / Family History    PROBLEM LIST   Patient Active Problem List   Diagnosis    Family hx of colon cancer    Sebaceous cyst    Encounter for screening mammogram for malignant neoplasm of breast    Family history of breast cancer    Sleeplessness    Dysmenorrhea    Irregular menses    Menorrhagia with irregular cycle    H/O cold sores    Anxiety and depression    Nocturia    Elevated serum creatinine    At high risk for breast cancer    Dense breast       PAST MEDICAL HISTORY  Past Medical History:   Diagnosis Date    Anxiety     Family history of colon cancer     Skin cyst        SURGICAL HISTORY  Past Surgical History:   Procedure Laterality Date    BREAST AUGMENTATION      October 2024    COLONOSCOPY N/A 09/01/2021    Procedure: COLONOSCOPY TO CECUM WITH HOT POLYPECTOMIES;  Surgeon: Jj Boucher MD;  Location: [facility] ENDOSCOPY;  Service: General;  Laterality: N/A;  FAMILY HISTORY OF COLON CANCER  COLON POLYPS    EXCISION MASS TRUNK N/A 02/17/2022    Procedure: TRUNK CYST EXCISION;  Surgeon: Jj Boucher MD;  Location: [facility] OR [facility] [place];  Service: General;  Laterality: N/A;    FACIAL COSMETIC SURGERY      INCISION AND DRAINAGE ABSCESS Left 12/03/2020    IN-OFFICE PROCEDURE: Incision and drainage of infected sebaceous cyst left upper quadrant abdominal wall - Dr. Jj Boucher       SOCIAL HISTORY  Social History     Socioeconomic History    Marital status: Single   Tobacco Use    Smoking status: Never     Passive exposure: Never    Smokeless tobacco: Never   Vaping Use    Vaping status: Never Used   Substance and Sexual Activity    Alcohol use: Yes     Alcohol/week: 3.0 - 5.0 standard drinks of alcohol     Types: 3 - 5 Standard drinks or equivalent per week     Comment: weekly    Drug use: Never    Sexual activity: Not Currently      Partners: Male     Birth control/protection: Condom       FAMILY HISTORY  Family History   Problem Relation Age of Onset    Breast cancer Mother     Skin cancer Mother     Colon cancer Father 59    Diabetes type II Maternal Grandfather     Heart failure Paternal Grandmother     Malig Hyperthermia Neg Hx        IMMUNIZATION HISTORY  Immunization History   Administered Date(s) Administered    COVID-19 (PFIZER) Purple Cap Monovalent 09/22/2021    Fluzone (or Fluarix & Flulaval for VFC) >6mos 10/26/2022, 12/01/2023    Influenza, Unspecified 10/26/2022    Tdap 10/26/2022    flucelvax quad pfs =>4 YRS 10/23/2020         REVIEW OF SYSTEMS     Review of Systems   Constitutional:  Negative for chills, fever and unexpected weight change.   Respiratory:  Negative for cough, chest tightness and shortness of breath.    Cardiovascular:  Negative for chest pain, palpitations and leg swelling.   Neurological:  Negative for dizziness, weakness, light-headedness and headaches.   Psychiatric/Behavioral:  The patient is not nervous/anxious.          PHYSICAL EXAMINATION     Physical Exam  Vitals reviewed.   Constitutional:       General: She is not in acute distress.     Appearance: Normal appearance. She is not ill-appearing, toxic-appearing or diaphoretic.   HENT:      Head: Normocephalic and atraumatic.      Right Ear: Tympanic membrane, ear canal and external ear normal. There is no impacted cerumen.      Left Ear: Tympanic membrane, ear canal and external ear normal. There is no impacted cerumen.      Nose: Nose normal. No congestion or rhinorrhea.      Mouth/Throat:      Mouth: Mucous membranes are moist.      Pharynx: Oropharynx is clear. No oropharyngeal exudate or posterior oropharyngeal erythema.   Eyes:      Extraocular Movements: Extraocular movements intact.      Conjunctiva/sclera: Conjunctivae normal.      Pupils: Pupils are equal, round, and reactive to light.   Cardiovascular:      Rate and Rhythm: Normal rate and  regular rhythm.      Heart sounds: Normal heart sounds.   Pulmonary:      Effort: Pulmonary effort is normal. No respiratory distress.      Breath sounds: Normal breath sounds.   Abdominal:      General: Bowel sounds are normal.      Palpations: Abdomen is soft.      Tenderness: There is no abdominal tenderness.   Musculoskeletal:         General: Normal range of motion.      Cervical back: Normal range of motion and neck supple.      Right lower leg: No edema.      Left lower leg: No edema.   Lymphadenopathy:      Cervical: No cervical adenopathy.   Skin:     General: Skin is warm and dry.   Neurological:      General: No focal deficit present.      Mental Status: She is alert and oriented to person, place, and time. Mental status is at baseline.   Psychiatric:         Mood and Affect: Mood normal.         Behavior: Behavior normal.         Thought Content: Thought content normal.         Judgment: Judgment normal.         REVIEWED DATA      Labs:    Lab Results   Component Value Date     04/17/2024    K 4.3 04/17/2024    CALCIUM 9.6 04/17/2024    AST 19 04/17/2024    ALT 16 04/17/2024    BUN 13 04/17/2024    CREATININE 1.05 (H) 04/17/2024    CREATININE 1.03 (H) 09/21/2023    CREATININE 1.16 (H) 05/18/2023    EGFRIFAFRI >60 06/24/2021       Lab Results   Component Value Date    GLUCOSE 78 04/17/2024    HGBA1C 4.60 (L) 04/17/2024    HGBA1C 4.50 (L) 09/21/2023    HGBA1C 4.60 (L) 05/18/2023    TSH 2.030 04/17/2024    FREET4 1.55 04/17/2024       Lab Results   Component Value Date    LDL 85 04/17/2024    HDL 89 (H) 04/17/2024    TRIG 42 04/17/2024    CHOLHDLRATIO 2.06 04/17/2024       Lab Results   Component Value Date    IJBU09BO 61.4 09/22/2022        Lab Results   Component Value Date    WBC 7.33 04/17/2024    HGB 12.6 04/17/2024    MCV 92.2 04/17/2024     04/17/2024       Lab Results   Component Value Date    PROTEIN Negative 09/22/2022    GLUCOSEU Negative 09/22/2022    BLOODU Negative 09/22/2022     NITRITEU Negative 09/22/2022    LEUKOCYTESUR Negative 05/18/2023          Lab Results   Component Value Date    HEPCVIRUSABY <0.1 09/22/2022       Imaging:        Medical Tests:        ASSESSMENT & PLAN     ANNUAL WELLNESS EXAM / PHYSICAL     Diagnoses and all orders for this visit:    1. Annual physical exam (Primary)  -     CBC & Differential  -     Comprehensive Metabolic Panel  -     Hemoglobin A1c  -     Lipid Panel With / Chol / HDL Ratio  -     TSH+Free T4  -     Urinalysis With Microscopic If Indicated (No Culture) - Urine, Clean Catch    2. Anxiety and depression  Overview:  Continue citalopram 20 mg daily.      3. Elevated serum creatinine         Summary/Discussion:     Continue follow up with breast speciality office, GYN.  Update kidney function labs to ensure stability.    Next Appointment with me: Visit date not found    Return in about 1 year (around 4/21/2026) for Annual physical.      HEALTHCARE MAINTENANCE ISSUES     Cancer Screening:  Colon: Initial/Next screening at age: CURRENT and -Colonoscopy  Repeat colon cancer screening: every 5 years  Breast: Recommended monthly self exams; annual professional exam  Mammogram: every 1 year  Cervical:  Followed by GYN  Skin: Monthly self skin examination, annual exam by health professional  Lung: Does not meet criteria for lung cancer screening.   Other:    Screening Labs & Tests:  Lab results reviewed & discussed with the patient or test orders placed today.  EKG:  CV Screening: Lipid panel  DEXA (65+ or postmenopausal with risk factors):   HEP C (If born [birth year], or risk factors): Previously had negative screen  Other:     Immunization/Vaccinations (to be given today unless deferred by patient)  Influenza: Recommended annual influenza vaccine  Hepatitis A: Verify immunization records  Hepatitis B: Verify immunization records  Tetanus/Pertussis: Up to date  Pneumovax: Not needed at this time  Shingles: Not needed at this time  COVID: Does not plan to  get the latest booster    Lifestyle Counseling:  Lifestyle Modifications: Continue good lifestyle choices/modifications and Reduce exposure to stress if possible  Safety Issues: Always wear seatbelt, Avoid texting while driving   Use sunscreen, regular skin examination  Recommended annual dental/vision examination.  Emotional/Stress/Sleep: Reviewed and  given when appropriate      Health Maintenance   Topic Date Due    COVID-19 Vaccine (2 - 2024-25 season) 10/21/2025 (Originally 9/1/2024)    INFLUENZA VACCINE  07/01/2025    ANNUAL PHYSICAL  04/21/2026    COLORECTAL CANCER SCREENING  09/01/2026    MAMMOGRAM  10/04/2026    PAP SMEAR  05/01/2027    TDAP/TD VACCINES (2 - Td or Tdap) 10/26/2032    HEPATITIS C SCREENING  Completed    Pneumococcal Vaccine 0-49  Aged Out

## 2025-05-29 DIAGNOSIS — F41.9 ANXIETY AND DEPRESSION: ICD-10-CM

## 2025-05-29 DIAGNOSIS — F32.A ANXIETY AND DEPRESSION: ICD-10-CM

## 2025-05-29 RX ORDER — CITALOPRAM HYDROBROMIDE 20 MG/1
20 TABLET ORAL DAILY
Qty: 90 TABLET | Refills: 1 | Status: SHIPPED | OUTPATIENT
Start: 2025-05-29

## 2025-05-29 NOTE — TELEPHONE ENCOUNTER
Caller: Bridget Oliva    Relationship: Self    Best call back number: [phone]     Requested Prescriptions:   Requested Prescriptions     Pending Prescriptions Disp Refills    citalopram (CeleXA) 20 MG tablet 90 tablet 0     Sig: Take 1 tablet by mouth Daily.        Pharmacy where request should be sent: [facility][id number] [address] AT [place] [id number] [place], [place] [phone]      Last office visit with prescribing clinician: 4/21/2025   Last telemedicine visit with prescribing clinician: Visit date not found   Next office visit with prescribing clinician: 4/28/2026     Additional details provided by patient: OUT OF MEDICATION    Does the patient have less than a 3 day supply:  [x] Yes  [] No    Would you like a call back once the refill request has been completed: [] Yes [] No    If the office needs to give you a call back, can they leave a voicemail: [] Yes [] No    Zoraida Gallagher Rep   05/29/25 08:04 EDT

## 2025-08-19 DIAGNOSIS — F41.9 ANXIETY AND DEPRESSION: ICD-10-CM

## 2025-08-19 DIAGNOSIS — F32.A ANXIETY AND DEPRESSION: ICD-10-CM

## 2025-08-20 RX ORDER — CITALOPRAM HYDROBROMIDE 20 MG/1
20 TABLET ORAL DAILY
Qty: 90 TABLET | Refills: 1 | Status: SHIPPED | OUTPATIENT
Start: 2025-08-20

## (undated) DEVICE — ADHS SKIN SURG TISS VISC PREMIERPRO EXOFIN HI/VISC FAST/DRY

## (undated) DEVICE — SUT VIC 3/0 SH 27IN J416H

## (undated) DEVICE — PATIENT RETURN ELECTRODE, SINGLE-USE, CONTACT QUALITY MONITORING, ADULT, WITH 9FT CORD, FOR PATIENTS WEIGING OVER 33LBS. (15KG): Brand: MEGADYNE

## (undated) DEVICE — PENCL ES MEGADINE EZ/CLEAN BUTN W/HOLSTR 10FT

## (undated) DEVICE — ADAPT CLN BIOGUARD AIR/H2O DISP

## (undated) DEVICE — LN SMPL CO2 SHTRM SD STREAM W/M LUER

## (undated) DEVICE — PK PROC MINOR TOWER 40

## (undated) DEVICE — SUT VIC 5/0 PS2 18IN J495H

## (undated) DEVICE — KT ORCA ORCAPOD DISP STRL

## (undated) DEVICE — SKIN PREP TRAY W/CHG: Brand: MEDLINE INDUSTRIES, INC.

## (undated) DEVICE — CANN O2 ETCO2 FITS ALL CONN CO2 SMPL A/ 7IN DISP LF

## (undated) DEVICE — TUBING, SUCTION, 1/4" X 10', STRAIGHT: Brand: MEDLINE

## (undated) DEVICE — ELECTRD BLD EZ CLN MOD XLNG 2.75IN

## (undated) DEVICE — SENSR O2 OXIMAX FNGR A/ 18IN NONSTR

## (undated) DEVICE — SNAR POLYP SENSATION STDOVL 27 240 BX40

## (undated) DEVICE — GLV SURG PREMIERPRO ORTHO LTX PF SZ7.5 BRN

## (undated) DEVICE — THE SINGLE USE ETRAP – POLYP TRAP IS USED FOR SUCTION RETRIEVAL OF ENDOSCOPICALLY REMOVED POLYPS.: Brand: ETRAP